# Patient Record
Sex: MALE | Race: WHITE | Employment: FULL TIME | URBAN - METROPOLITAN AREA
[De-identification: names, ages, dates, MRNs, and addresses within clinical notes are randomized per-mention and may not be internally consistent; named-entity substitution may affect disease eponyms.]

---

## 2017-09-08 PROBLEM — M12.811 ROTATOR CUFF TEAR ARTHROPATHY OF RIGHT SHOULDER: Status: ACTIVE | Noted: 2017-09-08

## 2017-09-08 PROBLEM — M75.101 ROTATOR CUFF TEAR ARTHROPATHY OF RIGHT SHOULDER: Status: ACTIVE | Noted: 2017-09-08

## 2018-09-22 ENCOUNTER — APPOINTMENT (OUTPATIENT)
Dept: CT IMAGING | Facility: HOSPITAL | Age: 58
DRG: 164 | End: 2018-09-22
Attending: EMERGENCY MEDICINE
Payer: COMMERCIAL

## 2018-09-22 ENCOUNTER — APPOINTMENT (OUTPATIENT)
Dept: GENERAL RADIOLOGY | Facility: HOSPITAL | Age: 58
DRG: 164 | End: 2018-09-22
Attending: EMERGENCY MEDICINE
Payer: COMMERCIAL

## 2018-09-22 ENCOUNTER — HOSPITAL ENCOUNTER (INPATIENT)
Facility: HOSPITAL | Age: 58
LOS: 6 days | Discharge: HOME | DRG: 164 | End: 2018-09-28
Attending: EMERGENCY MEDICINE | Admitting: HOSPITALIST
Payer: COMMERCIAL

## 2018-09-22 DIAGNOSIS — J94.2 HEMOTHORAX ON LEFT: Primary | ICD-10-CM

## 2018-09-22 DIAGNOSIS — S22.42XD CLOSED FRACTURE OF MULTIPLE RIBS OF LEFT SIDE WITH ROUTINE HEALING, SUBSEQUENT ENCOUNTER: ICD-10-CM

## 2018-09-22 PROCEDURE — 99223 1ST HOSP IP/OBS HIGH 75: CPT | Performed by: HOSPITALIST

## 2018-09-22 PROCEDURE — 71260 CT THORAX DX C+: CPT | Performed by: EMERGENCY MEDICINE

## 2018-09-22 PROCEDURE — 71045 X-RAY EXAM CHEST 1 VIEW: CPT | Performed by: EMERGENCY MEDICINE

## 2018-09-22 RX ORDER — MORPHINE SULFATE 4 MG/ML
8 INJECTION, SOLUTION INTRAMUSCULAR; INTRAVENOUS ONCE
Status: COMPLETED | OUTPATIENT
Start: 2018-09-22 | End: 2018-09-22

## 2018-09-22 RX ORDER — MORPHINE SULFATE 4 MG/ML
10 INJECTION, SOLUTION INTRAMUSCULAR; INTRAVENOUS ONCE
Status: COMPLETED | OUTPATIENT
Start: 2018-09-22 | End: 2018-09-22

## 2018-09-23 ENCOUNTER — APPOINTMENT (OUTPATIENT)
Dept: ULTRASOUND IMAGING | Facility: HOSPITAL | Age: 58
DRG: 164 | End: 2018-09-23
Attending: INTERNAL MEDICINE
Payer: COMMERCIAL

## 2018-09-23 ENCOUNTER — APPOINTMENT (OUTPATIENT)
Dept: GENERAL RADIOLOGY | Facility: HOSPITAL | Age: 58
DRG: 164 | End: 2018-09-23
Attending: SURGERY
Payer: COMMERCIAL

## 2018-09-23 PROCEDURE — 99233 SBSQ HOSP IP/OBS HIGH 50: CPT | Performed by: HOSPITALIST

## 2018-09-23 PROCEDURE — 0W9B3ZZ DRAINAGE OF LEFT PLEURAL CAVITY, PERCUTANEOUS APPROACH: ICD-10-PCS | Performed by: INTERNAL MEDICINE

## 2018-09-23 PROCEDURE — 32555 ASPIRATE PLEURA W/ IMAGING: CPT | Performed by: INTERNAL MEDICINE

## 2018-09-23 PROCEDURE — 71045 X-RAY EXAM CHEST 1 VIEW: CPT | Performed by: SURGERY

## 2018-09-23 PROCEDURE — 5A09357 ASSISTANCE WITH RESPIRATORY VENTILATION, LESS THAN 24 CONSECUTIVE HOURS, CONTINUOUS POSITIVE AIRWAY PRESSURE: ICD-10-PCS | Performed by: HOSPITALIST

## 2018-09-23 PROCEDURE — 99223 1ST HOSP IP/OBS HIGH 75: CPT | Performed by: INTERNAL MEDICINE

## 2018-09-23 RX ORDER — 0.9 % SODIUM CHLORIDE 0.9 %
VIAL (ML) INJECTION
Status: DISPENSED
Start: 2018-09-23 | End: 2018-09-23

## 2018-09-23 RX ORDER — HYDROMORPHONE HYDROCHLORIDE 1 MG/ML
1 INJECTION, SOLUTION INTRAMUSCULAR; INTRAVENOUS; SUBCUTANEOUS EVERY 2 HOUR PRN
Status: DISCONTINUED | OUTPATIENT
Start: 2018-09-23 | End: 2018-09-24

## 2018-09-23 RX ORDER — SODIUM CHLORIDE 9 MG/ML
INJECTION, SOLUTION INTRAVENOUS CONTINUOUS
Status: ACTIVE | OUTPATIENT
Start: 2018-09-23 | End: 2018-09-23

## 2018-09-23 RX ORDER — LISINOPRIL 10 MG/1
10 TABLET ORAL DAILY
Status: DISCONTINUED | OUTPATIENT
Start: 2018-09-23 | End: 2018-09-28

## 2018-09-23 RX ORDER — HYDRALAZINE HYDROCHLORIDE 20 MG/ML
10 INJECTION INTRAMUSCULAR; INTRAVENOUS EVERY 4 HOURS PRN
Status: DISCONTINUED | OUTPATIENT
Start: 2018-09-23 | End: 2018-09-27

## 2018-09-23 RX ORDER — PANTOPRAZOLE SODIUM 40 MG/1
40 TABLET, DELAYED RELEASE ORAL
Status: DISCONTINUED | OUTPATIENT
Start: 2018-09-23 | End: 2018-09-24

## 2018-09-23 RX ORDER — ATORVASTATIN CALCIUM 20 MG/1
20 TABLET, FILM COATED ORAL NIGHTLY
Status: DISCONTINUED | OUTPATIENT
Start: 2018-09-23 | End: 2018-09-28

## 2018-09-23 RX ORDER — HYDROCODONE BITARTRATE AND ACETAMINOPHEN 5; 325 MG/1; MG/1
1 TABLET ORAL EVERY 4 HOURS PRN
Status: DISCONTINUED | OUTPATIENT
Start: 2018-09-23 | End: 2018-09-24

## 2018-09-23 RX ORDER — FAMOTIDINE 10 MG/ML
20 INJECTION, SOLUTION INTRAVENOUS EVERY 12 HOURS
Status: DISCONTINUED | OUTPATIENT
Start: 2018-09-23 | End: 2018-09-24

## 2018-09-23 RX ORDER — ONDANSETRON 2 MG/ML
4 INJECTION INTRAMUSCULAR; INTRAVENOUS EVERY 6 HOURS PRN
Status: DISCONTINUED | OUTPATIENT
Start: 2018-09-23 | End: 2018-09-27

## 2018-09-23 RX ORDER — SODIUM CHLORIDE 0.9 % (FLUSH) 0.9 %
10 SYRINGE (ML) INJECTION AS NEEDED
Status: DISCONTINUED | OUTPATIENT
Start: 2018-09-23 | End: 2018-09-28

## 2018-09-23 RX ORDER — MORPHINE SULFATE 2 MG/ML
3 INJECTION, SOLUTION INTRAMUSCULAR; INTRAVENOUS EVERY 2 HOUR PRN
Status: DISCONTINUED | OUTPATIENT
Start: 2018-09-23 | End: 2018-09-26

## 2018-09-23 RX ORDER — TRAMADOL HYDROCHLORIDE 50 MG/1
100 TABLET ORAL EVERY 6 HOURS PRN
Status: DISCONTINUED | OUTPATIENT
Start: 2018-09-23 | End: 2018-09-24

## 2018-09-23 RX ORDER — LIDOCAINE 50 MG/G
1 PATCH TOPICAL EVERY 24 HOURS
Status: DISCONTINUED | OUTPATIENT
Start: 2018-09-23 | End: 2018-09-28

## 2018-09-23 RX ORDER — BUPROPION HYDROCHLORIDE 150 MG/1
150 TABLET ORAL DAILY
Status: DISCONTINUED | OUTPATIENT
Start: 2018-09-23 | End: 2018-09-28

## 2018-09-23 RX ORDER — OXYCODONE AND ACETAMINOPHEN 10; 325 MG/1; MG/1
1 TABLET ORAL EVERY 4 HOURS PRN
Status: DISCONTINUED | OUTPATIENT
Start: 2018-09-23 | End: 2018-09-23

## 2018-09-23 RX ORDER — ONDANSETRON 2 MG/ML
4 INJECTION INTRAMUSCULAR; INTRAVENOUS EVERY 4 HOURS PRN
Status: DISCONTINUED | OUTPATIENT
Start: 2018-09-23 | End: 2018-09-23

## 2018-09-23 RX ORDER — OXYCODONE AND ACETAMINOPHEN 10; 325 MG/1; MG/1
1 TABLET ORAL EVERY 4 HOURS PRN
Status: DISCONTINUED | OUTPATIENT
Start: 2018-09-23 | End: 2018-09-24

## 2018-09-23 RX ORDER — ACETAMINOPHEN 500 MG
1000 TABLET ORAL EVERY 8 HOURS
Status: DISCONTINUED | OUTPATIENT
Start: 2018-09-23 | End: 2018-09-27

## 2018-09-23 RX ORDER — LEVOTHYROXINE SODIUM 0.07 MG/1
75 TABLET ORAL
Status: DISCONTINUED | OUTPATIENT
Start: 2018-09-23 | End: 2018-09-28

## 2018-09-23 RX ORDER — ALLOPURINOL 100 MG/1
100 TABLET ORAL DAILY
Status: DISCONTINUED | OUTPATIENT
Start: 2018-09-23 | End: 2018-09-28

## 2018-09-23 RX ORDER — ONDANSETRON 2 MG/ML
8 INJECTION INTRAMUSCULAR; INTRAVENOUS EVERY 8 HOURS PRN
Status: DISCONTINUED | OUTPATIENT
Start: 2018-09-23 | End: 2018-09-24

## 2018-09-23 RX ORDER — MORPHINE SULFATE 4 MG/ML
4 INJECTION, SOLUTION INTRAMUSCULAR; INTRAVENOUS EVERY 2 HOUR PRN
Status: DISCONTINUED | OUTPATIENT
Start: 2018-09-23 | End: 2018-09-24

## 2018-09-23 RX ORDER — DULOXETIN HYDROCHLORIDE 30 MG/1
90 CAPSULE, DELAYED RELEASE ORAL DAILY
Status: DISCONTINUED | OUTPATIENT
Start: 2018-09-23 | End: 2018-09-28

## 2018-09-23 RX ORDER — OXYCODONE AND ACETAMINOPHEN TABLETS 10; 300 MG/1; MG/1
1 TABLET ORAL EVERY 4 HOURS PRN
COMMUNITY

## 2018-09-23 RX ORDER — HYDROMORPHONE HYDROCHLORIDE 1 MG/ML
0.5 INJECTION, SOLUTION INTRAMUSCULAR; INTRAVENOUS; SUBCUTANEOUS EVERY 2 HOUR PRN
Status: DISCONTINUED | OUTPATIENT
Start: 2018-09-23 | End: 2018-09-24

## 2018-09-23 RX ORDER — SODIUM CHLORIDE 9 MG/ML
INJECTION, SOLUTION INTRAVENOUS ONCE
Status: COMPLETED | OUTPATIENT
Start: 2018-09-23 | End: 2018-09-23

## 2018-09-23 RX ORDER — SODIUM CHLORIDE 9 MG/ML
INJECTION, SOLUTION INTRAVENOUS
Status: COMPLETED
Start: 2018-09-23 | End: 2018-09-23
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
## 2018-09-23 NOTE — PLAN OF CARE
Problem: PAIN - ADULT  Goal: Verbalizes/displays adequate comfort level or patient's stated pain goal  INTERVENTIONS:  - Encourage pt to monitor pain and request assistance  - Assess pain using appropriate pain scale  - Administer analgesics based on type lead EKG if indicated  - Evaluate effectiveness of antiarrhythmic and heart rate control medications as ordered  - Initiate emergency measures for life threatening arrhythmias  - Monitor electrolytes and administer replacement therapy as ordered  Outcome:

## 2018-09-23 NOTE — PROCEDURES
[facility]  Procedure Note  2018     Vidhi Hernandez Patient Status:  Inpatient    1960 MRN [id number]   Location [facility] 2W/SW Attending Lissette Cavanaugh MD   Hosp Day # 1 PCP Maliha Avendaño MD     Procedure: Sybil Prey

## 2018-09-23 NOTE — CONSULTS
[facility]    Report of Consultation    Chandra Bianchi Patient Status:  Inpatient    1960 MRN [id number]   Location [facility] 2W/SW Attending Christelle Campuzano MD   Hosp Day # 1 PCP Geraldine Garcia MD     Date of Admission:  9 HCl (ZOFRAN) injection 8 mg, 8 mg, Intravenous, Q8H PRN  •  famoTIDine (PEPCID) injection 20 mg, 20 mg, Intravenous, Q12H  •  allopurinol (ZYLOPRIM) tab 100 mg, 100 mg, Oral, Daily  •  BuPROPion HCl ER (XL) (WELLBUTRIN XL) 150 MG 24 hr tab 150 mg, 150 mg, sounds normal; no masses,  no organomegaly  Extremities: extremities normal, atraumatic, no cyanosis or edema  Skin: Skin color, texture, turgor normal. No rashes or lesions  Psychiatric: calm  concrete thoughts   memory intact    Results:  Lab Results   C Steve Barahona MD on 9/23/2018 at 6:38          Ekg 12-lead    Result Date: 9/22/2018  ECG Report  Interpretation  --------------------------         Impression and Plan:  Patient Active Problem List:     Osteoarthrosis, unspecified whether genera laboratory data in detail. Also reviewed outside records.     Time spent in direct patient contact and decision making as well as counseling/coordination of care:    [facility] min    Shala 30     9/23
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
## 2018-09-23 NOTE — PROGRESS NOTES
[facility]    Progress Note    Marily Ng Patient Status:  Inpatient    1960 MRN [id number]   Location [facility] 2W/SW Attending Hammad Chase MD   Hosp Day # 1 PCP Ovidio Davis MD       Subjective:   Marily Ng is MD CORTEZ on 9/23/2018 at 8:09          Xr Chest Ap Portable  (cpt=71045)    Result Date: 9/22/2018  CONCLUSION:  1. Multiple left rib fractures with left pleural effusion tracking laterally and posteriorly.  Atelectasis in left lung base, and left perihilar re spent.     Jazmin Estes MD  09/23/18

## 2018-09-23 NOTE — PROGRESS NOTES
Addendum to pulmonary consultation:    I attempted thoracentesis. With ultrasound review, the material in the left pleural space was dense and highly loculated. Only 3 cc of mike blood was forthcoming with the larger bore thoracentesis catheter.   PRASANNA gunter

## 2018-09-23 NOTE — H&P
[address] [place] Patient Status:  Emergency    1960 MRN [id number]   Location [facility] Attending Graham Pickens MD   Hosp Day # 0 PCP Erich Turner MD     Date:   150 MG Oral Tablet 24 Hr   Yes No   Sig: daily. DULoxetine HCl (CYMBALTA) 30 MG Oral Cap DR Particles   Yes No   Sig: Take 90 mg by mouth daily.      Levothyroxine Sodium (SYNTHROID, LEVOTHROID) 88 MCG Oral Tab   Yes No   Sig: before breakfast.     Johnanna Shelter Normal rate, regular rhythm, no murmur, no edema. Gastrointestinal:  Soft, non-tender, non-distended, normal bowel sounds, no organomegaly. Lymphatics:  No lymphadenopathy neck, axilla, groin. Musculoskeletal: Normal range of motion. normal strength.
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
## 2018-09-24 ENCOUNTER — ANESTHESIA EVENT (OUTPATIENT)
Dept: SURGERY | Facility: HOSPITAL | Age: 58
DRG: 164 | End: 2018-09-24
Payer: COMMERCIAL

## 2018-09-24 ENCOUNTER — APPOINTMENT (OUTPATIENT)
Dept: CT IMAGING | Facility: HOSPITAL | Age: 58
DRG: 164 | End: 2018-09-24
Attending: SURGERY
Payer: COMMERCIAL

## 2018-09-24 ENCOUNTER — APPOINTMENT (OUTPATIENT)
Dept: GENERAL RADIOLOGY | Facility: HOSPITAL | Age: 58
DRG: 164 | End: 2018-09-24
Attending: PHYSICIAN ASSISTANT
Payer: COMMERCIAL

## 2018-09-24 ENCOUNTER — ANESTHESIA (OUTPATIENT)
Dept: SURGERY | Facility: HOSPITAL | Age: 58
DRG: 164 | End: 2018-09-24
Payer: COMMERCIAL

## 2018-09-24 DIAGNOSIS — J94.2 HEMOTHORAX ON LEFT: Primary | ICD-10-CM

## 2018-09-24 PROCEDURE — 0BCL4ZZ EXTIRPATION OF MATTER FROM LEFT LUNG, PERCUTANEOUS ENDOSCOPIC APPROACH: ICD-10-PCS | Performed by: THORACIC SURGERY (CARDIOTHORACIC VASCULAR SURGERY)

## 2018-09-24 PROCEDURE — 00HU33Z INSERTION OF INFUSION DEVICE INTO SPINAL CANAL, PERCUTANEOUS APPROACH: ICD-10-PCS | Performed by: ANESTHESIOLOGY

## 2018-09-24 PROCEDURE — 99233 SBSQ HOSP IP/OBS HIGH 50: CPT | Performed by: HOSPITALIST

## 2018-09-24 PROCEDURE — 0BCP4ZZ EXTIRPATION OF MATTER FROM LEFT PLEURA, PERCUTANEOUS ENDOSCOPIC APPROACH: ICD-10-PCS | Performed by: THORACIC SURGERY (CARDIOTHORACIC VASCULAR SURGERY)

## 2018-09-24 PROCEDURE — 30233N1 TRANSFUSION OF NONAUTOLOGOUS RED BLOOD CELLS INTO PERIPHERAL VEIN, PERCUTANEOUS APPROACH: ICD-10-PCS | Performed by: THORACIC SURGERY (CARDIOTHORACIC VASCULAR SURGERY)

## 2018-09-24 PROCEDURE — 3E0T3BZ INTRODUCTION OF ANESTHETIC AGENT INTO PERIPHERAL NERVES AND PLEXI, PERCUTANEOUS APPROACH: ICD-10-PCS | Performed by: ANESTHESIOLOGY

## 2018-09-24 PROCEDURE — 74177 CT ABD & PELVIS W/CONTRAST: CPT | Performed by: SURGERY

## 2018-09-24 PROCEDURE — 3E0R33Z INTRODUCTION OF ANTI-INFLAMMATORY INTO SPINAL CANAL, PERCUTANEOUS APPROACH: ICD-10-PCS | Performed by: ANESTHESIOLOGY

## 2018-09-24 PROCEDURE — 99233 SBSQ HOSP IP/OBS HIGH 50: CPT | Performed by: INTERNAL MEDICINE

## 2018-09-24 PROCEDURE — 62320 NJX INTERLAMINAR CRV/THRC: CPT | Performed by: ANESTHESIOLOGY

## 2018-09-24 PROCEDURE — 71045 X-RAY EXAM CHEST 1 VIEW: CPT | Performed by: PHYSICIAN ASSISTANT

## 2018-09-24 RX ORDER — BUPIVACAINE HYDROCHLORIDE AND EPINEPHRINE 2.5; 5 MG/ML; UG/ML
INJECTION, SOLUTION INFILTRATION; PERINEURAL AS NEEDED
Status: DISCONTINUED | OUTPATIENT
Start: 2018-09-24 | End: 2018-09-24 | Stop reason: HOSPADM

## 2018-09-24 RX ORDER — DIPHENHYDRAMINE HYDROCHLORIDE 50 MG/ML
12.5 INJECTION INTRAMUSCULAR; INTRAVENOUS EVERY 4 HOURS PRN
Status: DISCONTINUED | OUTPATIENT
Start: 2018-09-24 | End: 2018-09-28

## 2018-09-24 RX ORDER — FAMOTIDINE 10 MG/ML
20 INJECTION, SOLUTION INTRAVENOUS 2 TIMES DAILY
Status: DISCONTINUED | OUTPATIENT
Start: 2018-09-24 | End: 2018-09-27

## 2018-09-24 RX ORDER — NALOXONE HYDROCHLORIDE 0.4 MG/ML
0.08 INJECTION, SOLUTION INTRAMUSCULAR; INTRAVENOUS; SUBCUTANEOUS
Status: DISCONTINUED | OUTPATIENT
Start: 2018-09-24 | End: 2018-09-28

## 2018-09-24 RX ORDER — MORPHINE SULFATE 4 MG/ML
4 INJECTION, SOLUTION INTRAMUSCULAR; INTRAVENOUS EVERY 4 HOURS PRN
Status: DISCONTINUED | OUTPATIENT
Start: 2018-09-24 | End: 2018-09-24

## 2018-09-24 RX ORDER — LIDOCAINE HYDROCHLORIDE 10 MG/ML
INJECTION, SOLUTION INFILTRATION; PERINEURAL
Status: COMPLETED | OUTPATIENT
Start: 2018-09-24 | End: 2018-09-24

## 2018-09-24 RX ORDER — CEFAZOLIN SODIUM/WATER 2 G/20 ML
2 SYRINGE (ML) INTRAVENOUS ONCE
Status: COMPLETED | OUTPATIENT
Start: 2018-09-24 | End: 2018-09-24

## 2018-09-24 RX ORDER — SODIUM CHLORIDE 9 MG/ML
INJECTION, SOLUTION INTRAVENOUS
Status: COMPLETED
Start: 2018-09-24 | End: 2018-09-24

## 2018-09-24 RX ORDER — GLYCOPYRROLATE 0.2 MG/ML
INJECTION INTRAMUSCULAR; INTRAVENOUS AS NEEDED
Status: DISCONTINUED | OUTPATIENT
Start: 2018-09-24 | End: 2018-09-24 | Stop reason: SURG

## 2018-09-24 RX ORDER — DOCUSATE SODIUM 100 MG/1
100 CAPSULE, LIQUID FILLED ORAL 2 TIMES DAILY
Status: DISCONTINUED | OUTPATIENT
Start: 2018-09-24 | End: 2018-09-28

## 2018-09-24 RX ORDER — ONDANSETRON 2 MG/ML
4 INJECTION INTRAMUSCULAR; INTRAVENOUS ONCE AS NEEDED
Status: DISCONTINUED | OUTPATIENT
Start: 2018-09-24 | End: 2018-09-24

## 2018-09-24 RX ORDER — ROCURONIUM BROMIDE 10 MG/ML
INJECTION, SOLUTION INTRAVENOUS AS NEEDED
Status: DISCONTINUED | OUTPATIENT
Start: 2018-09-24 | End: 2018-09-24 | Stop reason: SURG

## 2018-09-24 RX ORDER — MORPHINE SULFATE 4 MG/ML
6 INJECTION, SOLUTION INTRAMUSCULAR; INTRAVENOUS EVERY 4 HOURS PRN
Status: DISCONTINUED | OUTPATIENT
Start: 2018-09-24 | End: 2018-09-24

## 2018-09-24 RX ORDER — BUPIVACAINE HYDROCHLORIDE 2.5 MG/ML
INJECTION, SOLUTION EPIDURAL; INFILTRATION; INTRACAUDAL AS NEEDED
Status: DISCONTINUED | OUTPATIENT
Start: 2018-09-24 | End: 2018-09-24 | Stop reason: SURG

## 2018-09-24 RX ORDER — NEOSTIGMINE METHYLSULFATE 0.5 MG/ML
INJECTION INTRAVENOUS AS NEEDED
Status: DISCONTINUED | OUTPATIENT
Start: 2018-09-24 | End: 2018-09-24 | Stop reason: SURG

## 2018-09-24 RX ORDER — DEXTROSE, SODIUM CHLORIDE, AND POTASSIUM CHLORIDE 5; .9; .15 G/100ML; G/100ML; G/100ML
INJECTION INTRAVENOUS CONTINUOUS
Status: DISCONTINUED | OUTPATIENT
Start: 2018-09-24 | End: 2018-09-27

## 2018-09-24 RX ORDER — DEXAMETHASONE SODIUM PHOSPHATE 4 MG/ML
VIAL (ML) INJECTION AS NEEDED
Status: DISCONTINUED | OUTPATIENT
Start: 2018-09-24 | End: 2018-09-24 | Stop reason: SURG

## 2018-09-24 RX ORDER — LABETALOL HYDROCHLORIDE 5 MG/ML
INJECTION, SOLUTION INTRAVENOUS AS NEEDED
Status: DISCONTINUED | OUTPATIENT
Start: 2018-09-24 | End: 2018-09-24 | Stop reason: SURG

## 2018-09-24 RX ORDER — HYDROCODONE BITARTRATE AND ACETAMINOPHEN 10; 325 MG/1; MG/1
1 TABLET ORAL EVERY 4 HOURS PRN
Status: DISCONTINUED | OUTPATIENT
Start: 2018-09-24 | End: 2018-09-24

## 2018-09-24 RX ORDER — MIDAZOLAM HYDROCHLORIDE 1 MG/ML
INJECTION INTRAMUSCULAR; INTRAVENOUS AS NEEDED
Status: DISCONTINUED | OUTPATIENT
Start: 2018-09-24 | End: 2018-09-24 | Stop reason: SURG

## 2018-09-24 RX ORDER — LIDOCAINE HYDROCHLORIDE 40 MG/ML
SOLUTION TOPICAL AS NEEDED
Status: DISCONTINUED | OUTPATIENT
Start: 2018-09-24 | End: 2018-09-24 | Stop reason: SURG

## 2018-09-24 RX ORDER — ONDANSETRON 2 MG/ML
4 INJECTION INTRAMUSCULAR; INTRAVENOUS EVERY 6 HOURS PRN
Status: DISCONTINUED | OUTPATIENT
Start: 2018-09-24 | End: 2018-09-28

## 2018-09-24 RX ORDER — HYDROCODONE BITARTRATE AND ACETAMINOPHEN 10; 325 MG/1; MG/1
2 TABLET ORAL EVERY 4 HOURS PRN
Status: DISCONTINUED | OUTPATIENT
Start: 2018-09-24 | End: 2018-09-24

## 2018-09-24 RX ORDER — NALBUPHINE HCL 10 MG/ML
2.5 AMPUL (ML) INJECTION EVERY 4 HOURS PRN
Status: DISCONTINUED | OUTPATIENT
Start: 2018-09-24 | End: 2018-09-28

## 2018-09-24 RX ORDER — FAMOTIDINE 20 MG/1
20 TABLET ORAL 2 TIMES DAILY
Status: DISCONTINUED | OUTPATIENT
Start: 2018-09-24 | End: 2018-09-28

## 2018-09-24 RX ORDER — BISACODYL 10 MG
10 SUPPOSITORY, RECTAL RECTAL
Status: DISCONTINUED | OUTPATIENT
Start: 2018-09-24 | End: 2018-09-28

## 2018-09-24 RX ORDER — SODIUM CHLORIDE 9 MG/ML
INJECTION, SOLUTION INTRAVENOUS CONTINUOUS PRN
Status: DISCONTINUED | OUTPATIENT
Start: 2018-09-24 | End: 2018-09-24 | Stop reason: SURG

## 2018-09-24 RX ORDER — ONDANSETRON 2 MG/ML
INJECTION INTRAMUSCULAR; INTRAVENOUS AS NEEDED
Status: DISCONTINUED | OUTPATIENT
Start: 2018-09-24 | End: 2018-09-24 | Stop reason: SURG

## 2018-09-24 RX ORDER — SODIUM PHOSPHATE, DIBASIC AND SODIUM PHOSPHATE, MONOBASIC 7; 19 G/133ML; G/133ML
1 ENEMA RECTAL ONCE AS NEEDED
Status: DISCONTINUED | OUTPATIENT
Start: 2018-09-24 | End: 2018-09-28

## 2018-09-24 RX ORDER — LIDOCAINE HYDROCHLORIDE AND EPINEPHRINE 20; 5 MG/ML; UG/ML
INJECTION, SOLUTION EPIDURAL; INFILTRATION; INTRACAUDAL; PERINEURAL AS NEEDED
Status: DISCONTINUED | OUTPATIENT
Start: 2018-09-24 | End: 2018-09-24 | Stop reason: SURG

## 2018-09-24 RX ORDER — POLYETHYLENE GLYCOL 3350 17 G/17G
17 POWDER, FOR SOLUTION ORAL DAILY PRN
Status: DISCONTINUED | OUTPATIENT
Start: 2018-09-24 | End: 2018-09-27

## 2018-09-24 RX ORDER — MORPHINE SULFATE 4 MG/ML
2 INJECTION, SOLUTION INTRAMUSCULAR; INTRAVENOUS EVERY 4 HOURS PRN
Status: DISCONTINUED | OUTPATIENT
Start: 2018-09-24 | End: 2018-09-24

## 2018-09-24 RX ORDER — HEPARIN SODIUM 5000 [USP'U]/ML
5000 INJECTION, SOLUTION INTRAVENOUS; SUBCUTANEOUS EVERY 12 HOURS SCHEDULED
Status: DISCONTINUED | OUTPATIENT
Start: 2018-09-25 | End: 2018-09-28

## 2018-09-24 RX ORDER — HYDROMORPHONE HYDROCHLORIDE 1 MG/ML
INJECTION, SOLUTION INTRAMUSCULAR; INTRAVENOUS; SUBCUTANEOUS
Status: DISPENSED
Start: 2018-09-24 | End: 2018-09-25

## 2018-09-24 RX ORDER — ACETAMINOPHEN 325 MG/1
650 TABLET ORAL EVERY 4 HOURS PRN
Status: DISCONTINUED | OUTPATIENT
Start: 2018-09-24 | End: 2018-09-28

## 2018-09-24 RX ORDER — LIDOCAINE HYDROCHLORIDE AND EPINEPHRINE 15; 5 MG/ML; UG/ML
INJECTION, SOLUTION EPIDURAL
Status: COMPLETED | OUTPATIENT
Start: 2018-09-24 | End: 2018-09-24

## 2018-09-24 RX ADMIN — DEXAMETHASONE SODIUM PHOSPHATE 4 MG: 4 MG/ML VIAL (ML) INJECTION at 16:35:00

## 2018-09-24 RX ADMIN — LIDOCAINE HYDROCHLORIDE AND EPINEPHRINE 3 ML: 15; 5 INJECTION, SOLUTION EPIDURAL at 15:40:00

## 2018-09-24 RX ADMIN — LIDOCAINE HYDROCHLORIDE 4 ML: 40 SOLUTION TOPICAL at 15:30:00

## 2018-09-24 RX ADMIN — BUPIVACAINE HYDROCHLORIDE 5 ML: 2.5 INJECTION, SOLUTION EPIDURAL; INFILTRATION; INTRACAUDAL at 16:30:00

## 2018-09-24 RX ADMIN — SODIUM CHLORIDE: 9 INJECTION, SOLUTION INTRAVENOUS at 18:01:00

## 2018-09-24 RX ADMIN — BUPIVACAINE HYDROCHLORIDE 5 ML: 2.5 INJECTION, SOLUTION EPIDURAL; INFILTRATION; INTRACAUDAL at 16:14:00

## 2018-09-24 RX ADMIN — LIDOCAINE HYDROCHLORIDE AND EPINEPHRINE 5 ML: 20; 5 INJECTION, SOLUTION EPIDURAL; INFILTRATION; INTRACAUDAL; PERINEURAL at 16:14:00

## 2018-09-24 RX ADMIN — LIDOCAINE HYDROCHLORIDE 1 ML: 10 INJECTION, SOLUTION INFILTRATION; PERINEURAL at 15:40:00

## 2018-09-24 RX ADMIN — BUPIVACAINE HYDROCHLORIDE 5 ML: 2.5 INJECTION, SOLUTION EPIDURAL; INFILTRATION; INTRACAUDAL at 17:05:00

## 2018-09-24 RX ADMIN — NEOSTIGMINE METHYLSULFATE 4 MG: 0.5 INJECTION INTRAVENOUS at 17:20:00

## 2018-09-24 RX ADMIN — LABETALOL HYDROCHLORIDE 10 MG: 5 INJECTION, SOLUTION INTRAVENOUS at 17:26:00

## 2018-09-24 RX ADMIN — SODIUM CHLORIDE: 9 INJECTION, SOLUTION INTRAVENOUS at 15:23:00

## 2018-09-24 RX ADMIN — LABETALOL HYDROCHLORIDE 10 MG: 5 INJECTION, SOLUTION INTRAVENOUS at 17:20:00

## 2018-09-24 RX ADMIN — GLYCOPYRROLATE 0.8 MG: 0.2 INJECTION INTRAMUSCULAR; INTRAVENOUS at 17:20:00

## 2018-09-24 RX ADMIN — ROCURONIUM BROMIDE 50 MG: 10 INJECTION, SOLUTION INTRAVENOUS at 15:50:00

## 2018-09-24 RX ADMIN — ROCURONIUM BROMIDE 30 MG: 10 INJECTION, SOLUTION INTRAVENOUS at 16:40:00

## 2018-09-24 RX ADMIN — LIDOCAINE HYDROCHLORIDE AND EPINEPHRINE 3 ML: 20; 5 INJECTION, SOLUTION EPIDURAL; INFILTRATION; INTRACAUDAL; PERINEURAL at 15:28:00

## 2018-09-24 RX ADMIN — MIDAZOLAM HYDROCHLORIDE 2 MG: 1 INJECTION INTRAMUSCULAR; INTRAVENOUS at 15:24:00

## 2018-09-24 RX ADMIN — ONDANSETRON 4 MG: 2 INJECTION INTRAMUSCULAR; INTRAVENOUS at 16:35:00

## 2018-09-24 RX ADMIN — ROCURONIUM BROMIDE 20 MG: 10 INJECTION, SOLUTION INTRAVENOUS at 16:15:00

## 2018-09-24 NOTE — CONSULTS
[facility]    Report of Consultation    Rhoda Blank Patient Status:  Inpatient    1960 MRN [id number]   Location [facility] 2W/SW Attending Kassi Esquivel MD   Hosp Day # 2 PCP Erich Turner MD     Date of Admission:   Intravenous Q2H PRN   morphINE sulfate (PF) 4 MG/ML injection 4 mg 4 mg Intravenous Q2H PRN   TraMADol HCl (ULTRAM) tab 100 mg 100 mg Oral Q6H PRN   acetaminophen (TYLENOL EXTRA STRENGTH) tab 1,000 mg 1,000 mg Oral Q8H   ondansetron HCl (ZOFRAN) injection times daily as needed. Levothyroxine Sodium (SYNTHROID, LEVOTHROID) 88 MCG Oral Tab before breakfast.     lisinopril (PRINIVIL,ZESTRIL) 10 MG Oral Tab daily.      Omeprazole 40 MG Oral Capsule Delayed Release 40 mg.     Pravastatin Sodium (PRAVACHOL) 40 • lidocaine  1 patch Transdermal Q24H       Continuous Infusions:     Patient is awake alert and in moderate distress along the left side chest wall and left upper quadrant. .  The head is normocephalic. Pupils are equally round and reactive.   The sclera left rib fractures. 2. No additional acute traumatic abnormality within the abdomen/pelvis. 3. Dependent sludge and/or vicarious excretion of contrast within the gallbladder. 4. Probable bilateral renal cysts. 5. Colonic diverticulosis.  Periampullary duode Impression:     Hemothorax on left  Will need surgical evacuation and possible decortication      Closed fracture of multiple ribs of left side with routine healing, subsequent encounter          Recommendations:  Left VAT possible thoracotomy for zimmerman

## 2018-09-24 NOTE — ANESTHESIA PROCEDURE NOTES
Epidural Block  Date/Time: 9/24/2018 3:40 PM  Performed by: Reema Cowan MD  Authorized by: Reema Cowan MD     Patient Location:  OR  Start Time:  9/24/2018 3:40 PM  End Time:  9/24/2018 4:55 PM  Site Identification: surface landmarks    Reason for Bl
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
## 2018-09-24 NOTE — ANESTHESIA POSTPROCEDURE EVALUATION
Patient: Jemal Males    Procedure Summary     Date:  09/24/18 Room / Location:  [address] [place] [address] MAIN OR 03 / [address] [place] [address] MAIN OR    Anesthesia Start:  1518 Anesthesia Stop:  3216    Procedure:  THORACOSCOPY/VATS (Left ) Diagnosis:       Hemothorax on left      (Hemothorax o

## 2018-09-24 NOTE — ANESTHESIA PREPROCEDURE EVALUATION
Anesthesia PreOp Note    HPI:     Tree Basilio is a 59year old male who presents for preoperative consultation requested by: Maria De Jesus Cardona MD    Date of Surgery: 9/22/2018 - 9/24/2018    Procedure(s):  THORACOSCOPY/VATS  Indication: Hemothorax on l 06/20/2013      Spinal stenosis, lumbar region, without neurogenic claudication         Date Noted: 06/20/2013        Past Medical History:  No date: Arthritis  No date: High blood pressure  No date: High cholesterol  No date: Sleep apnea    Past Surgical (MARCAINE) 0.25 % injection   PRN Reema Cowan MD 5 mL at 09/24/18 1630   ondansetron HCl (ZOFRAN) injection 4 mg 4 mg Intravenous Once PRN Warden Deejay Walsh MD    Prochlorperazine Edisylate (COMPAZINE) injection 5 mg 5 mg Intravenous Once PRN Reema Cowan 09/24/18 0619   [MAR Hold] acetaminophen (TYLENOL EXTRA STRENGTH) tab 1,000 mg 1,000 mg Oral Q8H Dane Brock MD 1,000 mg at 09/24/18 0925   [MAR Hold] ondansetron HCl (ZOFRAN) injection 8 mg 8 mg Intravenous Q8H PRN MD [facility]   [MAR Hold] oxyCODONE-acetaminophen (PERCOCET)  MG per tab 1 tablet 1 tablet Oral Q4H PRN Aime Watkins MD    [MAR Hold] Normal Saline Flush 0.9 % injection 10 mL 10 mL Intravenous PRN Hansel Lopes MD      No current Epic-ordered outpatie No    Social History Narrative      Not on file      Available pre-op labs reviewed. Lab Results   Component Value Date    WBC 7.7 09/24/2018    RBC 2.80 (L) 09/24/2018    HGB 8.0 (L) 09/24/2018    HCT 24.4 (L) 09/24/2018    MCV 86.9 09/24/2018    MCH 28. epidural for post op pain control. GA/Art line possible need for blood transfusion discussed. Consent checked.       I have informed Buddy Mark and/or legal guardian or family member of the nature of the anesthetic plan, benefits, risks including possi

## 2018-09-24 NOTE — PROGRESS NOTES
[facility]    Progress Note    Lin Kaplan Patient Status:  Inpatient    1960 MRN [id number]   Location [facility] 2W/SW Attending Galo Chambers MD   Hosp Day # 2 PCP Nathan Robertson MD       Subjective:   Lin Zees is the abdomen/pelvis. 3. Dependent sludge and/or vicarious excretion of contrast within the gallbladder. 4. Probable bilateral renal cysts. 5. Colonic diverticulosis. Periampullary duodenal diverticulum.  6. 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

## 2018-09-24 NOTE — OPERATIVE REPORT
[facility] POST ANESTHESIA CARE UNIT  Operative Note     Harry Mayers Location: OR   [facility] [id number] MRN [id number]   Admission Date 9/22/2018 Operation Date 9/24/2018   Attending Physician Danni Almonte MD Operating Physician Zuleima Hernandez MD device removed all the rest of the clot and decorticated lung so it would expand and fill the space.   I then performed an intercostal nerve block multiple levels with quarter percent Marcaine  I then placed a 36 chest tube with the tip up at the apex as we

## 2018-09-24 NOTE — ANESTHESIA PROCEDURE NOTES
ANESTHESIA INTUBATION  Date/Time: 9/24/2018 3:50 PM  Urgency: elective    Airway not difficult    General Information and Staff    Anesthesiologist: Ibis Saldivar MD  Performed: anesthesiologist     Indications and Patient Condition  Indications for airwa

## 2018-09-24 NOTE — ANESTHESIA PROCEDURE NOTES
Arterial Line  Performed by: Yvan Rodriguez MD  Authorized by: Yvan Rodriguez MD     Procedure Start:  9/24/2018 3:28 PM  Procedure End:  9/24/2018 3:32 PM  Patient Location:  OR  Indication: continuous blood pressure monitoring    Anesthesiologist:  Galindo Arias

## 2018-09-24 NOTE — ANESTHESIA PROCEDURE NOTES
Peripheral IV  Date/Time: 9/24/2018 3:45 PM  Inserted by: Vincent Candelaria MD    Placement  Needle size: 18 G  Laterality: right  Location: antecubital  Site prep: alcohol  Attempts: 2

## 2018-09-24 NOTE — PROGRESS NOTES
[facility] - [facility]    Progress Note    Axel Lama Patient Status:  Inpatient    1960 MRN [id number]   Location [facility] 2W/SW Attending Constanza Nelson MD   Hosp Day # 2 PCP Sedrick Painting MD       Subjective:   Axel Lama is 86.9  87.2   --    --   86.9   MCH  27.9  28.4   --    --   28.4   MCHC  32.1  32.6   --    --   32.6   RDW  15.4*  15.4*   --    --   15.7*   WBC  8.6  9.7   --    --   7.7   PLT  368  288   --    --   241     Lab Results   Component Value Date    INR 1.3 Randall Holden MD on 9/23/2018 at 6:32     Approved by (CST): Randall Barahona MD on 9/23/2018 at 6:38          Ekg 12-lead    Result Date: 9/22/2018  ECG Report  Interpretation  -------------------------- Sinus Rhythm WITHIN NORMAL LIMITS When compared

## 2018-09-25 ENCOUNTER — APPOINTMENT (OUTPATIENT)
Dept: GENERAL RADIOLOGY | Facility: HOSPITAL | Age: 58
DRG: 164 | End: 2018-09-25
Attending: PHYSICIAN ASSISTANT
Payer: COMMERCIAL

## 2018-09-25 PROCEDURE — 71045 X-RAY EXAM CHEST 1 VIEW: CPT | Performed by: PHYSICIAN ASSISTANT

## 2018-09-25 PROCEDURE — 99233 SBSQ HOSP IP/OBS HIGH 50: CPT | Performed by: INTERNAL MEDICINE

## 2018-09-25 PROCEDURE — 99232 SBSQ HOSP IP/OBS MODERATE 35: CPT | Performed by: HOSPITALIST

## 2018-09-25 RX ORDER — HYDROMORPHONE HYDROCHLORIDE 1 MG/ML
0.2 INJECTION, SOLUTION INTRAMUSCULAR; INTRAVENOUS; SUBCUTANEOUS ONCE
Status: DISCONTINUED | OUTPATIENT
Start: 2018-09-24 | End: 2018-09-26

## 2018-09-25 RX ORDER — MELATONIN
325
Status: DISCONTINUED | OUTPATIENT
Start: 2018-09-25 | End: 2018-09-28

## 2018-09-25 NOTE — PLAN OF CARE
-Pleura vac changed by APN  - pt ambulating in room and up to chair  - Epidural removed at 0900, sub q Heparin to be held for 12 hours  - Dilaudid PCA controlling pain        CARDIOVASCULAR - ADULT    • Maintains optimal cardiac output and hemodynamic stab

## 2018-09-25 NOTE — PROGRESS NOTES
[facility]    Progress Note    Murtis Lax Patient Status:  Inpatient    1960 MRN [id number]   Location [facility] 2W/SW Attending Tammy Lafleur MD   Hosp Day # 3 PCP Prasanna Perez MD     Subjective:  Pt.  Awake, alert and o Extremities: Warm, dry, no LE edema bilat  Pulses: 2+ bilat DP  Skin: Left thoracic incision w/drsg= CDI  Neurological:  AAOx3, MAEW    Assessment/Plan:  S/P LEFT VATS/EVACUATION OF HEMATOMA/LUNG DECORTICATION POD #1  Encourage Pulm Toilet; IS-able to pe

## 2018-09-25 NOTE — PROGRESS NOTES
[facility]P - [facility]    Progress Note    Juliana Allan Patient Status:  Inpatient    1960 MRN [id number]   Location [facility] 2W/SW Attending Luis E Velarde MD   Hosp Day # 3 PCP Graciela Vega MD       Subjective:   Juliana Allan is left rib fractures. 2. No additional acute traumatic abnormality within the abdomen/pelvis. 3. Dependent sludge and/or vicarious excretion of contrast within the gallbladder. 4. Probable bilateral renal cysts. 5. Colonic diverticulosis.  Periampullary duode (CST): Vasyl Maloney MD on 9/24/2018 at 18:50          Xr Chest Ap Portable  (cpt=71045)    Result Date: 9/23/2018  CONCLUSION:  1. No significant change in the appearance of left pleural effusion.      Dictated by (CST): Yanely Ruby MD on 9/23/2 PCA  Chest tube in place  O2 protocol, was not on home o2.      Closed fracture of multiple ribs of left side with routine healing, subsequent encounter  Pain control with IV and PO pain meds   Surgical consult, Dr. Jarret Santana following   CT abd reviewed,

## 2018-09-25 NOTE — TRANSITION NOTE
2200--SHOUTING IN PAIN;  EPIDURAL STARTED AT 2054;  NO RELIEF AN HOUR LATER AFTER CONT RATE + 2 PCA BOLUSES. [facility] EPIDURAL DRESSING D/I. ANESTHESIA ON CALL DR MICHAEL NOTIFIED OF PT SHOUTING IN PAIN ONE HOUR AFTER EPIDURAL STARTED.   ONE MG DILAUDID GIV

## 2018-09-25 NOTE — ANESTHESIA POST-OP FOLLOW-UP NOTE
S/p thoracoscopy/VATS,thoracic epidural placement   POD # 1,doing well  During night minimal pain relief   VAS 7-8/10   Settings 5/3/30/11  Started IV Dilaudid pain is better controlled  Denies HA no BA no new neurologic signs or symptoms   Site is clean d
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
## 2018-09-26 ENCOUNTER — APPOINTMENT (OUTPATIENT)
Dept: GENERAL RADIOLOGY | Facility: HOSPITAL | Age: 58
DRG: 164 | End: 2018-09-26
Attending: PHYSICIAN ASSISTANT
Payer: COMMERCIAL

## 2018-09-26 PROCEDURE — 99232 SBSQ HOSP IP/OBS MODERATE 35: CPT | Performed by: HOSPITALIST

## 2018-09-26 PROCEDURE — 71045 X-RAY EXAM CHEST 1 VIEW: CPT | Performed by: PHYSICIAN ASSISTANT

## 2018-09-26 PROCEDURE — 99233 SBSQ HOSP IP/OBS HIGH 50: CPT | Performed by: INTERNAL MEDICINE

## 2018-09-26 RX ORDER — HYDROCODONE BITARTRATE AND ACETAMINOPHEN 5; 325 MG/1; MG/1
2 TABLET ORAL EVERY 4 HOURS PRN
Status: DISCONTINUED | OUTPATIENT
Start: 2018-09-26 | End: 2018-09-26

## 2018-09-26 RX ORDER — CALCIUM CARBONATE 200(500)MG
500 TABLET,CHEWABLE ORAL 2 TIMES DAILY PRN
Status: DISCONTINUED | OUTPATIENT
Start: 2018-09-26 | End: 2018-09-28

## 2018-09-26 RX ORDER — HYDROMORPHONE HYDROCHLORIDE 1 MG/ML
0.5 INJECTION, SOLUTION INTRAMUSCULAR; INTRAVENOUS; SUBCUTANEOUS EVERY 2 HOUR PRN
Status: DISCONTINUED | OUTPATIENT
Start: 2018-09-26 | End: 2018-09-27

## 2018-09-26 RX ORDER — HYDROMORPHONE HYDROCHLORIDE 1 MG/ML
0.4 INJECTION, SOLUTION INTRAMUSCULAR; INTRAVENOUS; SUBCUTANEOUS EVERY 2 HOUR PRN
Status: DISCONTINUED | OUTPATIENT
Start: 2018-09-26 | End: 2018-09-27

## 2018-09-26 RX ORDER — HYDROCODONE BITARTRATE AND ACETAMINOPHEN 5; 325 MG/1; MG/1
1 TABLET ORAL EVERY 4 HOURS PRN
Status: DISCONTINUED | OUTPATIENT
Start: 2018-09-26 | End: 2018-09-26

## 2018-09-26 RX ORDER — HYDROMORPHONE HYDROCHLORIDE 1 MG/ML
0.3 INJECTION, SOLUTION INTRAMUSCULAR; INTRAVENOUS; SUBCUTANEOUS EVERY 2 HOUR PRN
Status: DISCONTINUED | OUTPATIENT
Start: 2018-09-26 | End: 2018-09-27

## 2018-09-26 RX ORDER — HYDROCODONE BITARTRATE AND ACETAMINOPHEN 7.5; 325 MG/1; MG/1
2 TABLET ORAL EVERY 6 HOURS PRN
Status: DISCONTINUED | OUTPATIENT
Start: 2018-09-26 | End: 2018-09-27

## 2018-09-26 RX ORDER — HYDROCODONE BITARTRATE AND ACETAMINOPHEN 7.5; 325 MG/1; MG/1
1 TABLET ORAL EVERY 6 HOURS PRN
Status: DISCONTINUED | OUTPATIENT
Start: 2018-09-26 | End: 2018-09-27

## 2018-09-26 NOTE — PLAN OF CARE
Achieves optimal ventilation and oxygenation Progressing    Lt CT removed without difficulty. Shamika well. On room air with sats in upper 90's even upon ambulation.    Verbalizes/displays adequate comfort level or patient's stated pain goal Progressing    Pca

## 2018-09-26 NOTE — PROGRESS NOTES
[facility]    Progress Note    Andres Gibson Patient Status:  Inpatient    1960 MRN [id number]   Location [facility] 2W/SW Attending Nelson Ruvalcaba MD   [facility] Day # 4 PCP Elena Puckett MD       Subjective:   Andres Gibson is CREATSERUM  0.72  0.70  0.70   GFRAA  >60  >60  >60   GFRNAA  >60  >60  >60   CA  8.9  8.7  8.7   ALB   --   2.8*   --    NA  133*  138  138   K  5.5*  4.7  4.5   CL  103  106  106   CO2  26  26  24   ALKPHO   --   45   --    AST   --   35   --    ALT Date: 9/24/2018  CONCLUSION: Left chest tube has been placed. There is improved aeration of the left lung and there is decrease in size of a left pleural effusion. No pneumothorax. Atelectasis in the left lung base.    Dictated by (CST): Vasyl Maloney MD

## 2018-09-26 NOTE — PHYSICAL THERAPY NOTE
PHYSICAL THERAPY TREATMENT NOTE - INPATIENT     Room Number: 228/228-A            Problem List  Principal Problem:    Hemothorax on left  Active Problems:    Closed fracture of multiple ribs of left side with routine healing, subsequent encounter      EDIE Priyanka     AM-PAC Score:  Raw Score: 18   PT Approx Degree of Impairment Score: 46.58%   Standardized Score (AM-PAC Scale): 43.63   CMS Modifier (G-Code): CK    FUNCTIONAL ABILITY STATUS  Gait Assessment   Gait Assistance: Minimum assistance   150ft

## 2018-09-26 NOTE — PLAN OF CARE
CARDIOVASCULAR - ADULT    • Absence of cardiac arrhythmias or at baseline Progressing        Patient Centered Care    • Patient preferences are identified and integrated in the patient's plan of care Progressing        RESPIRATORY - ADULT    • Achieves opt
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
## 2018-09-26 NOTE — PROGRESS NOTES
[facility]    Progress Note    Axel Lama Patient Status:  Inpatient    1960 MRN [id number]   Location [facility] 2W/SW Attending Constanza Nelson MD   [facility] Day # 4 PCP Sedrick Painting MD       Subjective:   Axel Lama is left rib fractures. 2. No additional acute traumatic abnormality within the abdomen/pelvis. 3. Dependent sludge and/or vicarious excretion of contrast within the gallbladder. 4. Probable bilateral renal cysts. 5. Colonic diverticulosis.  Periampullary duode resolve, then followup CT scan is indicated.      Dictated by (CST): Flores Bragg MD on 9/25/2018 at 7:50     Approved by (CST): Flores Bragg MD on 9/25/2018 at 8:01          Xr Chest Ap Portable  (cpt=71045)    Result Date: 9/24/2018  CONCLUSION: Left examined. I agree with the above. The patient is feeling well. Chest tube is to be removed today. We will monitor and hopefully can go home soon.     Dev Rosa MD

## 2018-09-26 NOTE — PROGRESS NOTES
Assumed care of patient at this time. Plan of care and SBAR received from and reviewed with Bonnie Gutiérrez RN.
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
## 2018-09-26 NOTE — PHYSICAL THERAPY NOTE
PHYSICAL THERAPY EVALUATION - INPATIENT     Room Number: 228/228-A  Evaluation Date: 9/26/2018  Type of Evaluation: Initial           Reason for Therapy: Mobility Dysfunction and Discharge Planning    PHYSICAL THERAPY ASSESSMENT     Patient is a 59 year o date: TONSILLECTOMY    HOME SITUATION  Type of Home: House   Home Layout: Two level  Stairs to Enter : (3)  Railing: Yes  Stairs to Bedroom: (12)  Railing: Yes       Drives: Yes  Patient Owned Equipment: None       Prior Level of [place]: ind    SUBJE training    Patient End of Session: In bed    CURRENT GOALS    Goals to be met by: 10/3/18  Patient Goal Patient's self-stated goal is: to go home   Goal #1 Patient is able to demonstrate supine - sit EOB @ level: supervision     Goal #1   Current Status

## 2018-09-27 ENCOUNTER — APPOINTMENT (OUTPATIENT)
Dept: GENERAL RADIOLOGY | Facility: HOSPITAL | Age: 58
DRG: 164 | End: 2018-09-27
Attending: CLINICAL NURSE SPECIALIST
Payer: COMMERCIAL

## 2018-09-27 PROCEDURE — 99232 SBSQ HOSP IP/OBS MODERATE 35: CPT | Performed by: HOSPITALIST

## 2018-09-27 PROCEDURE — 71046 X-RAY EXAM CHEST 2 VIEWS: CPT | Performed by: CLINICAL NURSE SPECIALIST

## 2018-09-27 PROCEDURE — 99233 SBSQ HOSP IP/OBS HIGH 50: CPT | Performed by: INTERNAL MEDICINE

## 2018-09-27 RX ORDER — HYDROCODONE BITARTRATE AND ACETAMINOPHEN 10; 325 MG/1; MG/1
1-2 TABLET ORAL EVERY 4 HOURS PRN
Status: DISCONTINUED | OUTPATIENT
Start: 2018-09-27 | End: 2018-09-28

## 2018-09-27 RX ORDER — SENNOSIDES 8.6 MG
8.6 TABLET ORAL 2 TIMES DAILY
Status: DISCONTINUED | OUTPATIENT
Start: 2018-09-27 | End: 2018-09-28

## 2018-09-27 RX ORDER — POLYETHYLENE GLYCOL 3350 17 G/17G
17 POWDER, FOR SOLUTION ORAL DAILY
Status: DISCONTINUED | OUTPATIENT
Start: 2018-09-27 | End: 2018-09-28
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
## 2018-09-27 NOTE — PROGRESS NOTES
[facility] HOSP - [facility]    Progress Note    Mcdonaldedi Macarioana maría Patient Status:  Inpatient    1960 MRN [id number]   Location [facility] 2W/SW Attending Danni Almonte MD   Hosp Day # 5 PCP Amy Morgan MD       Subjective:   Harry Mayers is effusion/hemothorax. Continued left paratracheal soft tissue prominence. Followup studies are advised. Right lung field is grossly clear.      Dictated by (CST): Fadumo Buck MD on 9/27/2018 at 8:35     Approved by (CST): Fadumo Buck MD on 9/27/2018 at lung and there is decrease in size of a left pleural effusion. No pneumothorax. Atelectasis in the left lung base.    Dictated by (CST): Therese Jones MD on 9/24/2018 at 18:46     Approved by (CST): Therese Jones MD on 9/24/2018 at 18:50                  •

## 2018-09-27 NOTE — PROGRESS NOTES
[facility]    Progress Note    Elio Rao Patient Status:  Inpatient    1960 MRN [id number]   Location [facility] 2W/SW Attending Fredo Vera MD   Hosp Day # 5 PCP Omero Kearns MD       Subjective:   Elio Rao is a(n) heparin subcut  Pain medication increased to Norco 10/325 1-2 tabs every 4 hours as needed; lidoderm patch  Increase activity up and ambulate  Expected post-op anemia; hbg stable at 7.6 on iron hemodynamically stable  Discharge Planning; Home in next 24 ho atelectasis. 5. Patchy infiltrate or atelectasis right lung base remainder of the lung field is clear. This is unchanged. 6. Right rib fracture at about the third rib posterior laterally.      Dictated by (CST): Aicha Flowers MD on 9/26/2018 at 9:17

## 2018-09-27 NOTE — CM/SW NOTE
SW received and MDO regarding HHC/RN. Sw  met with patient and discussed benefits of home healthcare. Pt lives alone in house with 2 stairs to enter and then lives on the mail level.  Pt states that his sister who is an RN will be staying with him for 2 wee

## 2018-09-27 NOTE — PLAN OF CARE
- VSS  - requires 02, only 1 L today.   Desats to 86% without while sitting.  - encouraged frequently to use IS to prevent pneumonia  - norco dose increased and will give every 4 hrs   - will transfer to tele    RESPIRATORY - ADULT    • Achieves optimal rufino

## 2018-09-28 VITALS
DIASTOLIC BLOOD PRESSURE: 88 MMHG | OXYGEN SATURATION: 96 % | TEMPERATURE: 98 F | SYSTOLIC BLOOD PRESSURE: 140 MMHG | RESPIRATION RATE: 18 BRPM | BODY MASS INDEX: 36.17 KG/M2 | HEART RATE: 64 BPM | HEIGHT: 74 IN | WEIGHT: 281.81 LBS

## 2018-09-28 PROCEDURE — 99232 SBSQ HOSP IP/OBS MODERATE 35: CPT | Performed by: INTERNAL MEDICINE

## 2018-09-28 PROCEDURE — 99239 HOSP IP/OBS DSCHRG MGMT >30: CPT | Performed by: HOSPITALIST

## 2018-09-28 RX ORDER — LIDOCAINE 50 MG/G
1 PATCH TOPICAL EVERY 24 HOURS
Qty: 30 PATCH | Refills: 0 | Status: SHIPPED | OUTPATIENT
Start: 2018-09-28

## 2018-09-28 RX ORDER — HYDROCODONE BITARTRATE AND ACETAMINOPHEN 10; 325 MG/1; MG/1
1-2 TABLET ORAL EVERY 4 HOURS PRN
Qty: 30 TABLET | Refills: 0 | Status: SHIPPED | OUTPATIENT
Start: 2018-09-28 | End: 2022-01-13

## 2018-09-28 RX ORDER — MELATONIN
325
Qty: 90 TABLET | Refills: 0 | Status: SHIPPED | OUTPATIENT
Start: 2018-09-28 | End: 2022-01-13
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
## 2018-09-28 NOTE — PROGRESS NOTES
Pulmonary/Critical Care Follow Up Note    HPI:   Enzo Boyce is a 59year old male with Patient presents with:  Pain (neurologic)      PCP Dee Dutton MD  Admission Attending Srinath Belcher 15 Day #6    CP better  Mild sob  No fever  No c Intravenous, BID  •  allopurinol (ZYLOPRIM) tab 100 mg, 100 mg, Oral, Daily  •  BuPROPion HCl ER (XL) (WELLBUTRIN XL) 150 MG 24 hr tab 150 mg, 150 mg, Oral, Daily  •  DULoxetine HCl (CYMBALTA) DR particles cap 90 mg, 90 mg, Oral, Daily  •  Levothyroxine So

## 2018-09-28 NOTE — PHYSICAL THERAPY NOTE
PHYSICAL THERAPY TREATMENT NOTE - INPATIENT     Room Number: 320/320-A            Problem List  Principal Problem:    Hemothorax on left  Active Problems:    Closed fracture of multiple ribs of left side with routine healing, subsequent encounter      EDIE supervision, to d/c from acute PT at this time.  PT recommendation home with 24 hr supervision and HHPT upon d/c.     DISCHARGE RECOMMENDATIONS  PT Discharge Recommendations: Home with home health PT;24 hour care/supervision     PLAN  PT Treatment Plan: Bed Climbing 3-5 steps with a railing?: A Little     AM-PAC Score:  Raw Score: 20   PT Approx Degree of Impairment Score: 35.83%   Standardized Score (AM-PAC Scale): 47.67   CMS Modifier (G-Code): CJ    FUNCTIONAL ABILITY STATUS  Gait Assessment   Gait Assista

## 2018-09-28 NOTE — DISCHARGE SUMMARY
[facility] - [facility]    Discharge Summary    Joaquin Males Patient Status:  Inpatient    1960 MRN [id number]   Location [facility] 3W/SW Attending Ivone Schuster MD   [facility] Day # 6 PCP Sean Guzmán MD     Date of Admission: 2018 bowel sounds present  Neuro: No acute focal deficits  MSK: Full range of motion in extremities  Skin: Warm and dry  Psych: Normal affect  Ext: no c/c/e    History of Present Illness: Mikie Jaffe is a(n) 59year old male, with a past medical history signi Pardeep 1997 [address]             Geovanny Alvarez MD In 1 week.     Specialty:  Internal Medicine  Contact information:  [address] [facility] [address]             Sha White MD.    Specialties:  PULMONARY  MG Tabs  Commonly known as:  LYNOX      Take 1 tablet by mouth every 4 (four) hours as needed for Pain. Refills:  0     Pravastatin Sodium 40 MG Tabs  Commonly known as:  PRAVACHOL      nightly.    Refills:  0     VIAGRA 100 MG Tabs  Generic drug:
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
## 2018-10-16 ENCOUNTER — HOSPITAL ENCOUNTER (OUTPATIENT)
Dept: GENERAL RADIOLOGY | Facility: HOSPITAL | Age: 58
Discharge: HOME | End: 2018-10-16
Attending: NURSE PRACTITIONER
Payer: COMMERCIAL

## 2018-10-16 ENCOUNTER — OFFICE VISIT (OUTPATIENT)
Dept: PULMONOLOGY | Facility: CLINIC | Age: 58
End: 2018-10-16
Payer: COMMERCIAL

## 2018-10-16 VITALS
SYSTOLIC BLOOD PRESSURE: 143 MMHG | RESPIRATION RATE: 19 BRPM | HEART RATE: 83 BPM | WEIGHT: 276 LBS | DIASTOLIC BLOOD PRESSURE: 91 MMHG | OXYGEN SATURATION: 99 % | BODY MASS INDEX: 35.42 KG/M2 | HEIGHT: 74 IN

## 2018-10-16 DIAGNOSIS — S22.42XD CLOSED FRACTURE OF MULTIPLE RIBS OF LEFT SIDE WITH ROUTINE HEALING, SUBSEQUENT ENCOUNTER: ICD-10-CM

## 2018-10-16 DIAGNOSIS — J94.2 HEMOTHORAX ON LEFT: ICD-10-CM

## 2018-10-16 DIAGNOSIS — G47.33 OSA (OBSTRUCTIVE SLEEP APNEA): ICD-10-CM

## 2018-10-16 DIAGNOSIS — I71.9 AORTIC ANEURYSM WITHOUT RUPTURE, UNSPECIFIED PORTION OF AORTA (HCC): ICD-10-CM

## 2018-10-16 DIAGNOSIS — J94.2 HEMOTHORAX ON LEFT: Primary | ICD-10-CM

## 2018-10-16 PROCEDURE — 99212 OFFICE O/P EST SF 10 MIN: CPT | Performed by: INTERNAL MEDICINE

## 2018-10-16 PROCEDURE — 71046 X-RAY EXAM CHEST 2 VIEWS: CPT | Performed by: NURSE PRACTITIONER

## 2018-10-16 PROCEDURE — 99214 OFFICE O/P EST MOD 30 MIN: CPT | Performed by: INTERNAL MEDICINE

## 2018-10-16 RX ORDER — ZOLPIDEM TARTRATE 10 MG/1
10 TABLET ORAL NIGHTLY PRN
Qty: 30 TABLET | Refills: 5 | Status: SHIPPED | OUTPATIENT
Start: 2018-10-16

## 2018-10-16 NOTE — PROGRESS NOTES
Dear Amada Stearns,    As you know, Bigg Rogers is a 61-year-old male who I recently evaluated for traumatic rib fractures and massive hemothorax.   I had evaluated him many years ago for obstructive sleep apnea and he has been vigilant with use of his CPAP for many years; literature and zolpidem to be used on an as-needed basis. PROBLEM 3: 3.4 cm infrarenal abdominal aortic aneurysm    RECOMMENDATION: Ultrasound of the abdominal aorta at the one-year interval.    Thanks for inviting me to participate in [address], [facility].     Geraldine Hernandez

## 2018-10-23 PROBLEM — M72.2 PLANTAR FASCIITIS, BILATERAL: Status: ACTIVE | Noted: 2018-10-23

## 2018-10-23 PROBLEM — M21.071 ACQUIRED BILATERAL VALGUS DEFORMITY OF ANKLES: Status: ACTIVE | Noted: 2018-10-23

## 2018-10-23 PROBLEM — M21.072 ACQUIRED BILATERAL VALGUS DEFORMITY OF ANKLES: Status: ACTIVE | Noted: 2018-10-23

## 2018-11-09 ENCOUNTER — TELEPHONE (OUTPATIENT)
Dept: PULMONOLOGY | Facility: CLINIC | Age: 58
End: 2018-11-09

## 2018-11-09 DIAGNOSIS — G47.33 OSA (OBSTRUCTIVE SLEEP APNEA): Primary | ICD-10-CM

## 2018-11-09 NOTE — TELEPHONE ENCOUNTER
Per HME, pt will need a new PSG. Pt last PSG was in 2008. Pt requested a new cpap machine during last office visit. Ok to order PSG?  Please advise

## 2018-11-12 NOTE — TELEPHONE ENCOUNTER
LMTCB. Order for PSG/Titration placed. Pt needs to wait for Prior authorization from [facility] [phone] before scheduling with the Sleep Center [phone].

## 2018-12-06 ENCOUNTER — OFFICE VISIT (OUTPATIENT)
Dept: SLEEP CENTER | Age: 58
End: 2018-12-06
Attending: INTERNAL MEDICINE
Payer: COMMERCIAL

## 2018-12-06 DIAGNOSIS — Z76.89 SLEEP CONCERN: Primary | ICD-10-CM

## 2018-12-06 DIAGNOSIS — G47.33 OSA (OBSTRUCTIVE SLEEP APNEA): ICD-10-CM

## 2018-12-06 PROCEDURE — 95811 POLYSOM 6/>YRS CPAP 4/> PARM: CPT

## 2018-12-06 PROCEDURE — 95810 POLYSOM 6/> YRS 4/> PARAM: CPT

## 2018-12-10 ENCOUNTER — TELEPHONE (OUTPATIENT)
Dept: PULMONOLOGY | Facility: CLINIC | Age: 58
End: 2018-12-10

## 2018-12-10 NOTE — PROCEDURES
[address] [facility]  Accredited by the [facility] of [facility] (AASM)    PATIENT'S NAME: Denisha Dudley   ATTENDING PHYSICIAN: Elza Woodard MD   REFERRING PHYSICIAN: Elza Woodard MD   PATIENT ACCOUNT #: [de-identified] [facility] be determined. The patient spent the entirety of the diagnostic analysis in the right-sided position.   The respiratory event related arousal index is 32 events per hour, and the spontaneous arousal index is 11 events per hour for a combined arousal index CPAP was initiated at 6 CWP and titrated upward to a final value of 8 CWP during which the apnea plus hypopnea index remained 68 events per hour, and the lowest desaturation was to 87%.   Sleep architecture was severely fragmented during the diagnostic

## 2018-12-11 ENCOUNTER — OFFICE VISIT (OUTPATIENT)
Dept: SLEEP CENTER | Age: 58
End: 2018-12-11
Attending: INTERNAL MEDICINE
Payer: COMMERCIAL

## 2018-12-11 ENCOUNTER — ORDER TRANSCRIPTION (OUTPATIENT)
Dept: SLEEP CENTER | Age: 58
End: 2018-12-11

## 2018-12-11 DIAGNOSIS — G47.33 OSA (OBSTRUCTIVE SLEEP APNEA): Primary | ICD-10-CM

## 2018-12-11 DIAGNOSIS — Z76.89 SLEEP CONCERN: Primary | ICD-10-CM

## 2018-12-11 DIAGNOSIS — G47.33 OSA (OBSTRUCTIVE SLEEP APNEA): ICD-10-CM

## 2018-12-11 PROCEDURE — 95811 POLYSOM 6/>YRS CPAP 4/> PARM: CPT

## 2018-12-12 NOTE — TELEPHONE ENCOUNTER
Result Notes for GENERAL SLEEP STUDY TRANSCRIPTION     Notes recorded by Sandrine Chris RN on 12/11/2018 at 4:15 PM CST  Order for CPAP titration placed.  ------    Notes recorded by Lee Davalos MD on 12/10/2018 at 11:12 AM CST  RN, I spoke to t

## 2018-12-12 NOTE — TELEPHONE ENCOUNTER
Per Sarah Rolle nothing further is needed from our office. Explained she may want to cancel rx(s) she entered as there are now 3 rx(s). She voiced understanding.

## 2018-12-13 NOTE — PROCEDURES
[address] [facility]  Accredited by the [place] of Sleep Medicine (AASM)    PATIENT'S NAME: Nicholas Jasso   ATTENDING PHYSICIAN: Gabbie Jama MD   REFERRING PHYSICIAN: Gabbie Jama MD   PATIENT ACCOUNT #: [de-identified] LOCATION: [facility] events per hour, and the spontaneous arousal index is 1.6 events per hour for a combined arousal index of 6.2 events per hour.   There were 327 periodic limb movements for a periodic limb movement index of 54 events per hour of which 3.4 per hour were assoc

## 2018-12-18 ENCOUNTER — TELEPHONE (OUTPATIENT)
Dept: PULMONOLOGY | Facility: CLINIC | Age: 58
End: 2018-12-18

## 2018-12-18 DIAGNOSIS — G47.33 OSA (OBSTRUCTIVE SLEEP APNEA): Primary | ICD-10-CM

## 2018-12-18 NOTE — TELEPHONE ENCOUNTER
Pt informed that CPAP order was sent to [facility], gave pt telephone number to [facility], also informed him to schedule f/u appt 31-90 days after start of CPAP, pt states he will call us back to schedule appt.

## 2018-12-18 NOTE — TELEPHONE ENCOUNTER
----- Message from Phoebe Juares MD sent at 12/14/2018  5:57 PM CST -----  RN,  Please call the patient to facilitate CPAP 13 with appropriate f/u.

## 2019-02-15 ENCOUNTER — TELEPHONE (OUTPATIENT)
Dept: PULMONOLOGY | Facility: CLINIC | Age: 59
End: 2019-02-15

## 2019-02-15 NOTE — TELEPHONE ENCOUNTER
Hi Dr. Holly Samayoa,     I have pended a medical necessity letter for this patient. Insurance is requesting this for the second sleep study the patient had done on 12/11. Please advise.

## 2019-02-22 NOTE — TELEPHONE ENCOUNTER
Dr. Promise Maldonado there is a letter of medical necessity in pt chart pending for your review. If ok please sign letter. We can fax to billing. Thanks.

## 2019-02-26 NOTE — TELEPHONE ENCOUNTER
Letter of medical necessity was faxed. Confirmation sheet received. No further action required at this time.

## 2019-03-01 NOTE — TELEPHONE ENCOUNTER
Pat/Billing states this letter of medical necessity was not received. Please refax to [phone]. She would also like to know if there is a letter of medical necessity for DOS 12/6/18 (code [phone]). If this is available, please fax also.

## 2019-03-01 NOTE — TELEPHONE ENCOUNTER
Letter of medical necessity refaxed to [phone]. Confirmation sheet received. No letter for DOS 12/6/18 noted on pts chart.

## 2019-10-01 ENCOUNTER — TELEPHONE (OUTPATIENT)
Dept: PULMONOLOGY | Facility: CLINIC | Age: 59
End: 2019-10-01

## 2019-12-10 ENCOUNTER — HOSPITAL ENCOUNTER (OUTPATIENT)
Dept: ULTRASOUND IMAGING | Age: 59
Discharge: HOME | End: 2019-12-10
Attending: INTERNAL MEDICINE
Payer: COMMERCIAL

## 2019-12-10 DIAGNOSIS — I71.9 AORTIC ANEURYSM WITHOUT RUPTURE, UNSPECIFIED PORTION OF AORTA (HCC): ICD-10-CM

## 2019-12-10 DIAGNOSIS — G47.33 OSA (OBSTRUCTIVE SLEEP APNEA): ICD-10-CM

## 2019-12-10 PROCEDURE — 76770 US EXAM ABDO BACK WALL COMP: CPT | Performed by: INTERNAL MEDICINE

## 2019-12-13 PROBLEM — M48.02 FORAMINAL STENOSIS OF CERVICAL REGION: Status: ACTIVE | Noted: 2019-12-13

## 2019-12-13 PROBLEM — M54.12 BRACHIAL (CERVICAL) NEURITIS: Status: ACTIVE | Noted: 2019-12-13

## 2020-01-13 PROBLEM — M54.12 LEFT CERVICAL RADICULOPATHY: Status: ACTIVE | Noted: 2020-01-13

## 2020-08-27 NOTE — ADDENDUM NOTE
Addendum  created 09/25/18 0741 by Wendy Razo MD    Sign clinical note Called mother back, she said her GJ tube came all the way out. Mother did have an old gtube at home and went ahead and put that in. Ashley Schreiber is a little small, but she is tolerating feeds at a lower rate. Told mother we would check with IR and let her know a plan for today/tomorrow, but to call if anything changed in the meantime- she agreed.

## 2021-04-09 DIAGNOSIS — Z23 NEED FOR VACCINATION: ICD-10-CM

## 2021-06-18 ENCOUNTER — TELEPHONE (OUTPATIENT)
Dept: PULMONOLOGY | Facility: CLINIC | Age: 61
End: 2021-06-18

## 2021-12-28 ENCOUNTER — HOSPITAL ENCOUNTER (OUTPATIENT)
Dept: ULTRASOUND IMAGING | Facility: HOSPITAL | Age: 61
Discharge: HOME | End: 2021-12-28
Attending: INTERNAL MEDICINE
Payer: COMMERCIAL

## 2021-12-28 ENCOUNTER — HOSPITAL ENCOUNTER (OUTPATIENT)
Dept: GENERAL RADIOLOGY | Facility: HOSPITAL | Age: 61
Discharge: HOME | End: 2021-12-28
Attending: INTERNAL MEDICINE
Payer: COMMERCIAL

## 2021-12-28 ENCOUNTER — HOSPITAL ENCOUNTER (OUTPATIENT)
Dept: CV DIAGNOSTICS | Facility: HOSPITAL | Age: 61
Discharge: HOME | End: 2021-12-28
Attending: INTERNAL MEDICINE
Payer: COMMERCIAL

## 2021-12-28 DIAGNOSIS — I71.4 ABDOMINAL AORTIC ANEURYSM (AAA) WITHOUT RUPTURE (HCC): ICD-10-CM

## 2021-12-28 DIAGNOSIS — R06.00 DYSPNEA ON EXERTION: ICD-10-CM

## 2021-12-28 PROCEDURE — 93306 TTE W/DOPPLER COMPLETE: CPT | Performed by: INTERNAL MEDICINE

## 2021-12-28 PROCEDURE — 76770 US EXAM ABDO BACK WALL COMP: CPT | Performed by: INTERNAL MEDICINE

## 2021-12-28 PROCEDURE — 71046 X-RAY EXAM CHEST 2 VIEWS: CPT | Performed by: INTERNAL MEDICINE

## 2022-01-11 ENCOUNTER — HOSPITAL ENCOUNTER (OUTPATIENT)
Dept: CT IMAGING | Facility: HOSPITAL | Age: 62
Discharge: HOME | End: 2022-01-11
Attending: INTERNAL MEDICINE
Payer: COMMERCIAL

## 2022-01-11 DIAGNOSIS — I71.4 ABDOMINAL AORTIC ANEURYSM (AAA) WITHOUT RUPTURE (HCC): ICD-10-CM

## 2022-01-11 LAB — CREAT BLD-MCNC: 0.6 MG/DL

## 2022-01-11 PROCEDURE — 82565 ASSAY OF CREATININE: CPT

## 2022-01-11 PROCEDURE — 75635 CT ANGIO ABDOMINAL ARTERIES: CPT | Performed by: INTERNAL MEDICINE

## 2022-01-13 ENCOUNTER — OFFICE VISIT (OUTPATIENT)
Dept: PULMONOLOGY | Facility: CLINIC | Age: 62
End: 2022-01-13
Payer: COMMERCIAL

## 2022-01-13 VITALS
HEIGHT: 74 IN | WEIGHT: 304 LBS | HEART RATE: 60 BPM | OXYGEN SATURATION: 99 % | RESPIRATION RATE: 18 BRPM | DIASTOLIC BLOOD PRESSURE: 101 MMHG | SYSTOLIC BLOOD PRESSURE: 171 MMHG | BODY MASS INDEX: 39.01 KG/M2

## 2022-01-13 DIAGNOSIS — R06.00 DYSPNEA ON EXERTION: Primary | ICD-10-CM

## 2022-01-13 PROBLEM — J44.9 CHRONIC OBSTRUCTIVE PULMONARY DISEASE, UNSPECIFIED (HCC): Status: ACTIVE | Noted: 2022-01-13

## 2022-01-13 PROCEDURE — 3080F DIAST BP >= 90 MM HG: CPT | Performed by: INTERNAL MEDICINE

## 2022-01-13 PROCEDURE — 3008F BODY MASS INDEX DOCD: CPT | Performed by: INTERNAL MEDICINE

## 2022-01-13 PROCEDURE — 99203 OFFICE O/P NEW LOW 30 MIN: CPT | Performed by: INTERNAL MEDICINE

## 2022-01-13 PROCEDURE — 3077F SYST BP >= 140 MM HG: CPT | Performed by: INTERNAL MEDICINE

## 2022-01-13 RX ORDER — AZITHROMYCIN 250 MG/1
TABLET, FILM COATED ORAL
Qty: 6 TABLET | Refills: 0 | Status: SHIPPED | OUTPATIENT
Start: 2022-01-13 | End: 2022-01-18

## 2022-01-13 RX ORDER — FLUTICASONE FUROATE AND VILANTEROL TRIFENATATE 100; 25 UG/1; UG/1
1 POWDER RESPIRATORY (INHALATION) DAILY
Qty: 1 EACH | Refills: 6 | Status: SHIPPED | OUTPATIENT
Start: 2022-01-13

## 2022-01-13 NOTE — PROGRESS NOTES
Dear Tete Montes,    As you know, Estephania Nicole is a 78-year-old male who I am now seeing in follow-up for dyspnea on exertion and sleep apnea as I had assisted with his sleep apnea in the distant past and had seen him more recently a few years ago when he had a hemothorax appointment with cardiology and would consider further diagnostics with stress testing    PROBLEM 2: Obstructive sleep apnea–download today is pretty good with average daily usage 4 hours and 26 minutes and residual events 4/h.     RECOMMENDATION: Vigilance

## 2022-03-23 RX ORDER — ROSUVASTATIN CALCIUM 40 MG/1
20 TABLET, COATED ORAL NIGHTLY
COMMUNITY

## 2022-03-26 ENCOUNTER — LAB ENCOUNTER (OUTPATIENT)
Dept: LAB | Facility: HOSPITAL | Age: 62
End: 2022-03-26
Attending: INTERNAL MEDICINE
Payer: COMMERCIAL

## 2022-03-26 ENCOUNTER — HOSPITAL ENCOUNTER (OUTPATIENT)
Dept: GENERAL RADIOLOGY | Facility: HOSPITAL | Age: 62
Discharge: HOME | End: 2022-03-26
Attending: INTERNAL MEDICINE
Payer: COMMERCIAL

## 2022-03-26 DIAGNOSIS — Z01.818 PRE-OP TESTING: ICD-10-CM

## 2022-03-26 LAB
ANION GAP SERPL CALC-SCNC: 8 MMOL/L (ref 0–18)
BUN BLD-MCNC: 11 MG/DL (ref 7–18)
BUN/CREAT SERPL: 15.9 (ref 10–20)
CALCIUM BLD-MCNC: 9.5 MG/DL (ref 8.5–10.1)
CHLORIDE SERPL-SCNC: 104 MMOL/L (ref 98–112)
CO2 SERPL-SCNC: 28 MMOL/L (ref 21–32)
CREAT BLD-MCNC: 0.69 MG/DL
DEPRECATED RDW RBC AUTO: 57.9 FL (ref 35.1–46.3)
ERYTHROCYTE [DISTWIDTH] IN BLOOD BY AUTOMATED COUNT: 17 % (ref 11–15)
FASTING STATUS PATIENT QL REPORTED: YES
GLUCOSE BLD-MCNC: 157 MG/DL (ref 70–99)
HCT VFR BLD AUTO: 39.7 %
HGB BLD-MCNC: 12.4 G/DL
INR BLD: 0.94 (ref 0.8–1.2)
MCH RBC QN AUTO: 28.9 PG (ref 26–34)
MCHC RBC AUTO-ENTMCNC: 31.2 G/DL (ref 31–37)
MCV RBC AUTO: 92.5 FL
OSMOLALITY SERPL CALC.SUM OF ELEC: 293 MOSM/KG (ref 275–295)
PLATELET # BLD AUTO: 208 10(3)UL (ref 150–450)
POTASSIUM SERPL-SCNC: 4 MMOL/L (ref 3.5–5.1)
PROTHROMBIN TIME: 12.7 SECONDS (ref 11.6–14.8)
RBC # BLD AUTO: 4.29 X10(6)UL
SARS-COV-2 RNA RESP QL NAA+PROBE: NOT DETECTED
SODIUM SERPL-SCNC: 140 MMOL/L (ref 136–145)
WBC # BLD AUTO: 8.2 X10(3) UL (ref 4–11)

## 2022-03-26 PROCEDURE — 85610 PROTHROMBIN TIME: CPT

## 2022-03-26 PROCEDURE — 85027 COMPLETE CBC AUTOMATED: CPT

## 2022-03-26 PROCEDURE — 71046 X-RAY EXAM CHEST 2 VIEWS: CPT | Performed by: INTERNAL MEDICINE

## 2022-03-26 PROCEDURE — 80048 BASIC METABOLIC PNL TOTAL CA: CPT

## 2022-03-26 PROCEDURE — 36415 COLL VENOUS BLD VENIPUNCTURE: CPT

## 2022-03-29 ENCOUNTER — APPOINTMENT (OUTPATIENT)
Dept: CV DIAGNOSTICS | Facility: HOSPITAL | Age: 62
End: 2022-03-29
Attending: INTERNAL MEDICINE
Payer: COMMERCIAL

## 2022-03-29 ENCOUNTER — HOSPITAL ENCOUNTER (OUTPATIENT)
Dept: INTERVENTIONAL RADIOLOGY/VASCULAR | Facility: HOSPITAL | Age: 62
Setting detail: OBSERVATION
Discharge: HOME | End: 2022-03-30
Attending: INTERNAL MEDICINE | Admitting: INTERNAL MEDICINE
Payer: COMMERCIAL

## 2022-03-29 DIAGNOSIS — Z01.818 PRE-OP TESTING: Primary | ICD-10-CM

## 2022-03-29 DIAGNOSIS — R94.39 ABNORMAL NUCLEAR STRESS TEST: ICD-10-CM

## 2022-03-29 DIAGNOSIS — R06.00 DOE (DYSPNEA ON EXERTION): ICD-10-CM

## 2022-03-29 PROBLEM — I25.10 CAD (CORONARY ARTERY DISEASE): Status: ACTIVE | Noted: 2022-03-29

## 2022-03-29 LAB — ISTAT ACTIVATED CLOTTING TIME: 190 SECONDS (ref 125–137)

## 2022-03-29 PROCEDURE — B240ZZ3 ULTRASONOGRAPHY OF SINGLE CORONARY ARTERY, INTRAVASCULAR: ICD-10-PCS | Performed by: INTERNAL MEDICINE

## 2022-03-29 PROCEDURE — 93306 TTE W/DOPPLER COMPLETE: CPT | Performed by: INTERNAL MEDICINE

## 2022-03-29 PROCEDURE — 4A023N7 MEASUREMENT OF CARDIAC SAMPLING AND PRESSURE, LEFT HEART, PERCUTANEOUS APPROACH: ICD-10-PCS | Performed by: INTERNAL MEDICINE

## 2022-03-29 PROCEDURE — 99220 INITIAL OBSERVATION CARE,LEVL III: CPT | Performed by: HOSPITALIST

## 2022-03-29 PROCEDURE — 027037Z DILATION OF CORONARY ARTERY, ONE ARTERY WITH FOUR OR MORE DRUG-ELUTING INTRALUMINAL DEVICES, PERCUTANEOUS APPROACH: ICD-10-PCS | Performed by: INTERNAL MEDICINE

## 2022-03-29 PROCEDURE — B2111ZZ FLUOROSCOPY OF MULTIPLE CORONARY ARTERIES USING LOW OSMOLAR CONTRAST: ICD-10-PCS | Performed by: INTERNAL MEDICINE

## 2022-03-29 RX ORDER — NITROGLYCERIN 20 MG/100ML
INJECTION INTRAVENOUS
Status: COMPLETED
Start: 2022-03-29 | End: 2022-03-29

## 2022-03-29 RX ORDER — ALLOPURINOL 100 MG/1
100 TABLET ORAL NIGHTLY
Status: DISCONTINUED | OUTPATIENT
Start: 2022-03-29 | End: 2022-03-30

## 2022-03-29 RX ORDER — VERAPAMIL HYDROCHLORIDE 2.5 MG/ML
INJECTION, SOLUTION INTRAVENOUS
Status: COMPLETED
Start: 2022-03-29 | End: 2022-03-29

## 2022-03-29 RX ORDER — ACETAMINOPHEN 325 MG/1
650 TABLET ORAL EVERY 6 HOURS PRN
Status: DISCONTINUED | OUTPATIENT
Start: 2022-03-29 | End: 2022-03-30

## 2022-03-29 RX ORDER — HYDROCODONE BITARTRATE AND ACETAMINOPHEN 5; 325 MG/1; MG/1
2 TABLET ORAL EVERY 4 HOURS PRN
Status: DISCONTINUED | OUTPATIENT
Start: 2022-03-29 | End: 2022-03-30

## 2022-03-29 RX ORDER — NITROGLYCERIN 0.4 MG/1
TABLET SUBLINGUAL
Status: COMPLETED
Start: 2022-03-29 | End: 2022-03-29

## 2022-03-29 RX ORDER — LIDOCAINE HYDROCHLORIDE 20 MG/ML
INJECTION, SOLUTION EPIDURAL; INFILTRATION; INTRACAUDAL; PERINEURAL
Status: COMPLETED
Start: 2022-03-29 | End: 2022-03-29

## 2022-03-29 RX ORDER — ASPIRIN 81 MG/1
81 TABLET ORAL DAILY
Status: CANCELLED | OUTPATIENT
Start: 2022-03-30

## 2022-03-29 RX ORDER — LISINOPRIL 10 MG/1
10 TABLET ORAL NIGHTLY
Status: DISCONTINUED | OUTPATIENT
Start: 2022-03-29 | End: 2022-03-29

## 2022-03-29 RX ORDER — CETIRIZINE HYDROCHLORIDE 10 MG/1
10 TABLET ORAL DAILY
Status: DISCONTINUED | OUTPATIENT
Start: 2022-03-29 | End: 2022-03-30

## 2022-03-29 RX ORDER — PROCHLORPERAZINE EDISYLATE 5 MG/ML
5 INJECTION INTRAMUSCULAR; INTRAVENOUS EVERY 8 HOURS PRN
Status: DISCONTINUED | OUTPATIENT
Start: 2022-03-29 | End: 2022-03-30

## 2022-03-29 RX ORDER — HEPARIN SODIUM 1000 [USP'U]/ML
INJECTION, SOLUTION INTRAVENOUS; SUBCUTANEOUS
Status: COMPLETED
Start: 2022-03-29 | End: 2022-03-29

## 2022-03-29 RX ORDER — MIDAZOLAM HYDROCHLORIDE 1 MG/ML
INJECTION INTRAMUSCULAR; INTRAVENOUS
Status: DISCONTINUED
Start: 2022-03-29 | End: 2022-03-29 | Stop reason: WASHOUT

## 2022-03-29 RX ORDER — SODIUM CHLORIDE 9 MG/ML
INJECTION, SOLUTION INTRAVENOUS CONTINUOUS
Status: ACTIVE | OUTPATIENT
Start: 2022-03-29 | End: 2022-03-29

## 2022-03-29 RX ORDER — ECHINACEA PURPUREA EXTRACT 125 MG
1 TABLET ORAL
Status: DISCONTINUED | OUTPATIENT
Start: 2022-03-29 | End: 2022-03-30

## 2022-03-29 RX ORDER — LISINOPRIL 10 MG/1
10 TABLET ORAL NIGHTLY
Status: DISCONTINUED | OUTPATIENT
Start: 2022-03-30 | End: 2022-03-30

## 2022-03-29 RX ORDER — SODIUM CHLORIDE 9 MG/ML
INJECTION, SOLUTION INTRAVENOUS
Status: COMPLETED | OUTPATIENT
Start: 2022-03-29 | End: 2022-03-29

## 2022-03-29 RX ORDER — LISINOPRIL 10 MG/1
10 TABLET ORAL ONCE
Status: COMPLETED | OUTPATIENT
Start: 2022-03-29 | End: 2022-03-29

## 2022-03-29 RX ORDER — ASPIRIN 81 MG/1
81 TABLET, CHEWABLE ORAL DAILY
Status: DISCONTINUED | OUTPATIENT
Start: 2022-03-30 | End: 2022-03-30

## 2022-03-29 RX ORDER — ACETAMINOPHEN 325 MG/1
650 TABLET ORAL EVERY 4 HOURS PRN
Status: DISCONTINUED | OUTPATIENT
Start: 2022-03-29 | End: 2022-03-30

## 2022-03-29 RX ORDER — ASPIRIN 81 MG/1
81 TABLET ORAL DAILY
Status: DISCONTINUED | OUTPATIENT
Start: 2022-03-30 | End: 2022-03-29

## 2022-03-29 RX ORDER — LEVOTHYROXINE SODIUM 88 UG/1
88 TABLET ORAL
Status: DISCONTINUED | OUTPATIENT
Start: 2022-03-29 | End: 2022-03-30

## 2022-03-29 RX ORDER — TEMAZEPAM 15 MG/1
15 CAPSULE ORAL NIGHTLY PRN
Status: DISCONTINUED | OUTPATIENT
Start: 2022-03-29 | End: 2022-03-30

## 2022-03-29 RX ORDER — BUPROPION HYDROCHLORIDE 150 MG/1
150 TABLET ORAL NIGHTLY
Status: DISCONTINUED | OUTPATIENT
Start: 2022-03-29 | End: 2022-03-30

## 2022-03-29 RX ORDER — ONDANSETRON 2 MG/ML
4 INJECTION INTRAMUSCULAR; INTRAVENOUS EVERY 6 HOURS PRN
Status: DISCONTINUED | OUTPATIENT
Start: 2022-03-29 | End: 2022-03-30

## 2022-03-29 RX ORDER — PANTOPRAZOLE SODIUM 40 MG/1
40 TABLET, DELAYED RELEASE ORAL
Status: DISCONTINUED | OUTPATIENT
Start: 2022-03-30 | End: 2022-03-30

## 2022-03-29 RX ORDER — MIDAZOLAM HYDROCHLORIDE 1 MG/ML
INJECTION INTRAMUSCULAR; INTRAVENOUS
Status: COMPLETED
Start: 2022-03-29 | End: 2022-03-29

## 2022-03-29 RX ORDER — HYDROCODONE BITARTRATE AND ACETAMINOPHEN 5; 325 MG/1; MG/1
1 TABLET ORAL EVERY 4 HOURS PRN
Status: DISCONTINUED | OUTPATIENT
Start: 2022-03-29 | End: 2022-03-30

## 2022-03-29 RX ORDER — ROSUVASTATIN CALCIUM 20 MG/1
20 TABLET, COATED ORAL NIGHTLY
Status: DISCONTINUED | OUTPATIENT
Start: 2022-03-29 | End: 2022-03-30

## 2022-03-29 RX ADMIN — SODIUM CHLORIDE: 9 INJECTION, SOLUTION INTRAVENOUS at 14:33:00

## 2022-03-29 RX ADMIN — SODIUM CHLORIDE: 9 INJECTION, SOLUTION INTRAVENOUS at 10:30:00

## 2022-03-29 RX ADMIN — CETIRIZINE HYDROCHLORIDE 10 MG: 10 TABLET ORAL at 18:28:00

## 2022-03-29 RX ADMIN — ACETAMINOPHEN 650 MG: 325 TABLET ORAL at 14:49:00

## 2022-03-29 NOTE — PROCEDURES
[facility]    Cardiac Cath Procedure Note  Lela Caruso Patient Status:  Outpatient in a Bed    1960 MRN [id number]   Location [facility] Attending Noris Juarez MD   Hosp Day # 0 PCP Genesis Alexandre MD       Cardiologist: Rhona Lucio MD  Primary Proceduralist: Rhona Lucio MD  Procedure Performed: LHC, LV and IVUS and PCI RCA  Date of Procedure: 3/29/2022   Indication: Positive stress test    Summary of procedure: Focal mid RCA stenosis status post ADALI x1 complicated by anterograde and retrograde dissection requiring \"full metal jacket\" of the proximal to distal RCA. Total 4 drug-eluting stents placed. Left Ventriculography and hemodynamics:   LV EF not done  LV EDP 5 mmHg, 1 L fluid bolus given  No gradient across aortic valve        Coronary Angiography  RCA:  Dominant, large vessel 5 to 6 mm. 99% mid RCA stenosis. Large distal runoff to the PDA and PL system. Left main:  Free of obstructive disease    Left anterior descending:  Free of obstructive disease, supplies diagonals which are non-obstructive    Circumflex:  Free of obstructive disease, supplies OM branches which are patent      RCA intervention  Lesion Characteristics- severely torturous, mildly calcified. Type non-C lesion. Pre-intervention stenosis 99%, Post intervention stenosis 0%. Pre GRACE 3, Post GRACE 3.      Guide Catheter: Hockey stick  Wire: Run through   Pre-dil:  4.0 x 15 mm compliant balloon. After pre-dil there was dissection both antegrade and retrograde therefore stent placed somewhat distal to cover antegrade dissection. Stent:  5 x 12 mm Jeremy ADALI at 14 umberto  After stent deployment spiral dissection both antegrade and retrograde with extensive dye staining initially concerning for large perforation however hemodynamically patient remained stable.   Quickly removed stent catheter, placed guide liner to the mid vessel, used 4 x 20 mm compliant balloon to balloon assist track guide liner to the mid/distal vessel. Angiogram done from guide extension to identify any distal perforation, extent of antegrade dissection. Vandemere as though stenting the dissection first prior to placing covered stent or surgery would be okay. Stent: 4 x 38 mm Cameron ADALI at 16 umberto at the distal to 5 mm stent  Stent: 4 x 26 mm Cameron ADALI at 18 umberto proximal to 5 mm stent  There seem to be extension of antegrade dissection distal into the the distal RCA  Use new 4 x 15 mm balloon to balloon assist track guide liner to the distal RCA to deliver stent  Stent: 4 x 18 mm Jeremy ADALI at 14 umberto  Post-dil: 5 x 12 mm compliant balloon to the entirety of the RCA  Intravascular ultrasound: Would not track to the distal vessel however the majority of the stented segment of the RCA was visualized. All visualized segments with adequately expanded stent and adequately opposed struts. There was dye staining at the ostium of the RCA which based on intravascular ultrasound demonstrated hematoma trapped behind the stent. Stent extended to the ostium of the RCA. Assessment:  CAD status post PCI the RCA complicated by stent edge dissection, status post full metal jacket of the RCA  No pericardial effusion at end of the case based on bedside echo  Preserved ejection fraction  AAA, 4.5 cm      Recommendations:  DAPT: Aspirin and Brilinta  Echocardiogram in a couple hours today, observe overnight      Summary of Case: After written informed consent was obtained from the patient, patient was brought to the cardiac catheterization laboratory. Patient was prepped and draped in the usual sterile fashion. Lidocaine 1% was used to infiltrate the right radial artery for local anesthesia and a 6 Malawian introducer sheath was inserted into the right radial artery. Selective coronary angiography performed with JR4 catheter for RCA and JL3.5 catheter for LCA. Angiography performed in standard projections.       6 Korean JR4 catheter placed in LV for hemodynamics. Specimen sent to: No specimen collected  Estimated blood loss: 10 cc  Closure:  TR band      IV was maintained by RN and moderate conscious sedation of versed and fentanyl was given. Patient was assessed and monitoring of oxygen, heart rate and blood pressure by nurse and myself during the exam 45 minutes.       Emmanuel Rose MD  03/29/22

## 2022-03-29 NOTE — IVS NOTE
EKG completed       3/29 1308 Hand-Off     Procedure hand off report given to [facility]. Pt's vital signs are stable. Procedural access site is dry and intact with no signs and symptoms of bleeding and hematoma. Bedrest status x 4 hrs   Dr. Lisset Mathias and Dr. Cheryl Cee  spoke with patient post procedure.        3/29 1340   Bedside handoff to [facility]   Pt denies any pain or discomfort  Procedural site dry and intact, no bleeding or swelling noted  Activity restriction and bedrest status reviewed  Pt transferred with belongings     Repeat Echo @ 1500 and tomorrow 3/30

## 2022-03-29 NOTE — INTERVAL H&P NOTE
Pre-op Diagnosis: * No pre-op diagnosis entered *    The above referenced H&P was reviewed by Cristo Fong MD on 3/29/2022, the patient was examined and no significant changes have occurred in the patient's condition since the H&P was performed. I discussed with the patient and/or legal representative the potential benefits, risks and side effects of this procedure; the likelihood of the patient achieving goals; and potential problems that might occur during recuperation. I discussed reasonable alternatives to the procedure, including risks, benefits and side effects related to the alternatives and risks related to not receiving this procedure. We will proceed with procedure as planned.
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
## 2022-03-29 NOTE — RESPIRATORY THERAPY NOTE
FRANCESCA ASSESSMENT:    Pt does have a previous diagnosis of FRANCESCA. Pt does routinely use a CPAP device at home. CPAP INITIATION:    Pt to be placed on CPAP: yes  Pt refused: no  CPAP settings: CPAP of 13 using at home per patient.   Interface: Nasal pillow

## 2022-03-30 ENCOUNTER — APPOINTMENT (OUTPATIENT)
Dept: CV DIAGNOSTICS | Facility: HOSPITAL | Age: 62
End: 2022-03-30
Attending: INTERNAL MEDICINE
Payer: COMMERCIAL

## 2022-03-30 VITALS
WEIGHT: 309.81 LBS | RESPIRATION RATE: 18 BRPM | HEART RATE: 61 BPM | BODY MASS INDEX: 39.76 KG/M2 | TEMPERATURE: 98 F | DIASTOLIC BLOOD PRESSURE: 90 MMHG | OXYGEN SATURATION: 97 % | HEIGHT: 74 IN | SYSTOLIC BLOOD PRESSURE: 149 MMHG

## 2022-03-30 LAB
ANION GAP SERPL CALC-SCNC: 5 MMOL/L (ref 0–18)
BASOPHILS # BLD AUTO: 0.03 X10(3) UL (ref 0–0.2)
BASOPHILS NFR BLD AUTO: 0.3 %
BUN BLD-MCNC: 8 MG/DL (ref 7–18)
BUN/CREAT SERPL: 11.4 (ref 10–20)
CALCIUM BLD-MCNC: 9.7 MG/DL (ref 8.5–10.1)
CHLORIDE SERPL-SCNC: 104 MMOL/L (ref 98–112)
CO2 SERPL-SCNC: 30 MMOL/L (ref 21–32)
CREAT BLD-MCNC: 0.7 MG/DL
DEPRECATED RDW RBC AUTO: 57 FL (ref 35.1–46.3)
EOSINOPHIL # BLD AUTO: 0.37 X10(3) UL (ref 0–0.7)
EOSINOPHIL NFR BLD AUTO: 4.2 %
ERYTHROCYTE [DISTWIDTH] IN BLOOD BY AUTOMATED COUNT: 16.6 % (ref 11–15)
GLUCOSE BLD-MCNC: 135 MG/DL (ref 70–99)
HCT VFR BLD AUTO: 40.3 %
HGB BLD-MCNC: 12.6 G/DL
IMM GRANULOCYTES # BLD AUTO: 0.06 X10(3) UL (ref 0–1)
IMM GRANULOCYTES NFR BLD: 0.7 %
LYMPHOCYTES # BLD AUTO: 1.5 X10(3) UL (ref 1–4)
LYMPHOCYTES NFR BLD AUTO: 16.9 %
MCH RBC QN AUTO: 29 PG (ref 26–34)
MCHC RBC AUTO-ENTMCNC: 31.3 G/DL (ref 31–37)
MCV RBC AUTO: 92.9 FL
MONOCYTES # BLD AUTO: 0.57 X10(3) UL (ref 0.1–1)
MONOCYTES NFR BLD AUTO: 6.4 %
NEUTROPHILS # BLD AUTO: 6.33 X10 (3) UL (ref 1.5–7.7)
NEUTROPHILS # BLD AUTO: 6.33 X10(3) UL (ref 1.5–7.7)
NEUTROPHILS NFR BLD AUTO: 71.5 %
OSMOLALITY SERPL CALC.SUM OF ELEC: 288 MOSM/KG (ref 275–295)
PLATELET # BLD AUTO: 181 10(3)UL (ref 150–450)
POTASSIUM SERPL-SCNC: 4.1 MMOL/L (ref 3.5–5.1)
RBC # BLD AUTO: 4.34 X10(6)UL
SODIUM SERPL-SCNC: 139 MMOL/L (ref 136–145)
WBC # BLD AUTO: 8.9 X10(3) UL (ref 4–11)

## 2022-03-30 PROCEDURE — 93308 TTE F-UP OR LMTD: CPT | Performed by: INTERNAL MEDICINE

## 2022-03-30 PROCEDURE — 99217 OBSERVATION CARE DISCHARGE: CPT | Performed by: HOSPITALIST

## 2022-03-30 RX ORDER — ASPIRIN 81 MG/1
81 TABLET, CHEWABLE ORAL DAILY
Qty: 30 TABLET | Refills: 11 | Status: SHIPPED | OUTPATIENT
Start: 2022-03-31

## 2022-03-30 RX ADMIN — CETIRIZINE HYDROCHLORIDE 10 MG: 10 TABLET ORAL at 08:34:00

## 2022-03-30 RX ADMIN — ASPIRIN 81 MG: 81 TABLET, CHEWABLE ORAL at 08:34:00

## 2022-03-30 RX ADMIN — PANTOPRAZOLE SODIUM 40 MG: 40 TABLET, DELAYED RELEASE ORAL at 04:54:00

## 2022-03-30 RX ADMIN — LEVOTHYROXINE SODIUM 88 MCG: 88 TABLET ORAL at 04:54:00

## 2022-03-30 NOTE — PLAN OF CARE
Problem: Patient Centered Care  Goal: Patient preferences are identified and integrated in the patient's plan of care  Description: Interventions:  - What would you like us to know as we care for you?  I wear a CPAP at night  - Provide timely, complete, and accurate information to patient/family  - Incorporate patient and family knowledge, values, beliefs, and cultural backgrounds into the planning and delivery of care  - Encourage patient/family to participate in care and decision-making at the level they choose  - Honor patient and family perspectives and choices  3/30/2022 1616 by Jameel Wolf RN  Outcome: Completed  3/30/2022 1344 by Jameel Wolf RN  Outcome: Progressing     Problem: RESPIRATORY - ADULT  Goal: Achieves optimal ventilation and oxygenation  Description: INTERVENTIONS:  - Assess for changes in respiratory status  - Assess for changes in mentation and behavior  - Position to facilitate oxygenation and minimize respiratory effort  - Oxygen supplementation based on oxygen saturation or ABGs  - Provide Smoking Cessation handout, if applicable  - Encourage broncho-pulmonary hygiene including cough, deep breathe, Incentive Spirometry  - Assess the need for suctioning and perform as needed  - Assess and instruct to report SOB or any respiratory difficulty  - Respiratory Therapy support as indicated  - Manage/alleviate anxiety  - Monitor for signs/symptoms of CO2 retention  3/30/2022 1616 by Jameel Wolf RN  Outcome: Completed  3/30/2022 1344 by Jameel Wolf RN  Outcome: Progressing     Problem: METABOLIC/FLUID AND ELECTROLYTES - ADULT  Goal: Electrolytes maintained within normal limits  Description: INTERVENTIONS:  - Monitor labs and rhythm and assess patient for signs and symptoms of electrolyte imbalances  - Administer electrolyte replacement as ordered  - Monitor response to electrolyte replacements, including rhythm and repeat lab results as appropriate  - Fluid restriction as ordered  - Instruct patient on fluid and nutrition restrictions as appropriate  3/30/2022 1616 by Bernadine Gil RN  Outcome: Completed  3/30/2022 1344 by Bernadine Gil RN  Outcome: Progressing  Goal: Hemodynamic stability and optimal renal function maintained  Description: INTERVENTIONS:  - Monitor labs and assess for signs and symptoms of volume excess or deficit  - Monitor intake, output and patient weight  - Monitor urine specific gravity, serum osmolarity and serum sodium as indicated or ordered  - Monitor response to interventions for patient's volume status, including labs, urine output, blood pressure (other measures as available)  - Encourage oral intake as appropriate  - Instruct patient on fluid and nutrition restrictions as appropriate  3/30/2022 1616 by Bernadine Gil RN  Outcome: Completed  3/30/2022 1344 by Bernadine Gil RN  Outcome: Progressing     Problem: SKIN/TISSUE INTEGRITY - ADULT  Goal: Incision(s), wounds(s) or drain site(s) healing without S/S of infection  Description: INTERVENTIONS:  - Assess and document risk factors for pressure ulcer development  - Assess and document skin integrity  - Assess and document dressing/incision, wound bed, drain sites and surrounding tissue  - Implement wound care per orders  - Initiate isolation precautions as appropriate  - Initiate Pressure Ulcer prevention bundle as indicated  3/30/2022 1616 by Bernadine Gil RN  Outcome: Completed  3/30/2022 1344 by Bernadine Gil RN  Outcome: Progressing     Problem: Patient Centered Care  Goal: Patient preferences are identified and integrated in the patient's plan of care  Description: Interventions:  - What would you like us to know as we care for you?  I wear a CPAP at night  - Provide timely, complete, and accurate information to patient/family  - Incorporate patient and family knowledge, values, beliefs, and cultural backgrounds into the planning and delivery of care  - Encourage patient/family to participate in care and decision-making at the level they choose  - Honor patient and family perspectives and choices  3/30/2022 1616 by Ronel Romero RN  Outcome: Completed  3/30/2022 1344 by Ronel Romero RN  Outcome: Progressing     Problem: RESPIRATORY - ADULT  Goal: Achieves optimal ventilation and oxygenation  Description: INTERVENTIONS:  - Assess for changes in respiratory status  - Assess for changes in mentation and behavior  - Position to facilitate oxygenation and minimize respiratory effort  - Oxygen supplementation based on oxygen saturation or ABGs  - Provide Smoking Cessation handout, if applicable  - Encourage broncho-pulmonary hygiene including cough, deep breathe, Incentive Spirometry  - Assess the need for suctioning and perform as needed  - Assess and instruct to report SOB or any respiratory difficulty  - Respiratory Therapy support as indicated  - Manage/alleviate anxiety  - Monitor for signs/symptoms of CO2 retention  3/30/2022 1616 by Ronel Romero RN  Outcome: Completed  3/30/2022 1344 by Ronel Romero RN  Outcome: Progressing     Problem: METABOLIC/FLUID AND ELECTROLYTES - ADULT  Goal: Electrolytes maintained within normal limits  Description: INTERVENTIONS:  - Monitor labs and rhythm and assess patient for signs and symptoms of electrolyte imbalances  - Administer electrolyte replacement as ordered  - Monitor response to electrolyte replacements, including rhythm and repeat lab results as appropriate  - Fluid restriction as ordered  - Instruct patient on fluid and nutrition restrictions as appropriate  3/30/2022 1616 by Ronel Romero RN  Outcome: Completed  3/30/2022 1344 by Ronel Romero RN  Outcome: Progressing  Goal: Hemodynamic stability and optimal renal function maintained  Description: INTERVENTIONS:  - Monitor labs and assess for signs and symptoms of volume excess or deficit  - Monitor intake, output and patient weight  - Monitor urine specific gravity, serum osmolarity and serum sodium as indicated or ordered  - Monitor response to interventions for patient's volume status, including labs, urine output, blood pressure (other measures as available)  - Encourage oral intake as appropriate  - Instruct patient on fluid and nutrition restrictions as appropriate  3/30/2022 1616 by Bryan Soares RN  Outcome: Completed  3/30/2022 1344 by Bryan Soares RN  Outcome: Progressing     Problem: SKIN/TISSUE INTEGRITY - ADULT  Goal: Incision(s), wounds(s) or drain site(s) healing without S/S of infection  Description: INTERVENTIONS:  - Assess and document risk factors for pressure ulcer development  - Assess and document skin integrity  - Assess and document dressing/incision, wound bed, drain sites and surrounding tissue  - Implement wound care per orders  - Initiate isolation precautions as appropriate  - Initiate Pressure Ulcer prevention bundle as indicated  3/30/2022 1616 by Bryan Soares RN  Outcome: Completed  3/30/2022 1344 by Bryan Soares RN  Outcome: Progressing

## 2022-03-30 NOTE — PLAN OF CARE
Problem: Patient Centered Care  Goal: Patient preferences are identified and integrated in the patient's plan of care  Description: Interventions:  - What would you like us to know as we care for you?  I wear a CPAP at night  - Provide timely, complete, and accurate information to patient/family  - Incorporate patient and family knowledge, values, beliefs, and cultural backgrounds into the planning and delivery of care  - Encourage patient/family to participate in care and decision-making at the level they choose  - Honor patient and family perspectives and choices  Outcome: Progressing     Problem: RESPIRATORY - ADULT  Goal: Achieves optimal ventilation and oxygenation  Description: INTERVENTIONS:  - Assess for changes in respiratory status  - Assess for changes in mentation and behavior  - Position to facilitate oxygenation and minimize respiratory effort  - Oxygen supplementation based on oxygen saturation or ABGs  - Provide Smoking Cessation handout, if applicable  - Encourage broncho-pulmonary hygiene including cough, deep breathe, Incentive Spirometry  - Assess the need for suctioning and perform as needed  - Assess and instruct to report SOB or any respiratory difficulty  - Respiratory Therapy support as indicated  - Manage/alleviate anxiety  - Monitor for signs/symptoms of CO2 retention  Outcome: Progressing     Problem: METABOLIC/FLUID AND ELECTROLYTES - ADULT  Goal: Electrolytes maintained within normal limits  Description: INTERVENTIONS:  - Monitor labs and rhythm and assess patient for signs and symptoms of electrolyte imbalances  - Administer electrolyte replacement as ordered  - Monitor response to electrolyte replacements, including rhythm and repeat lab results as appropriate  - Fluid restriction as ordered  - Instruct patient on fluid and nutrition restrictions as appropriate  Outcome: Progressing  Goal: Hemodynamic stability and optimal renal function maintained  Description: INTERVENTIONS:  - Monitor labs and assess for signs and symptoms of volume excess or deficit  - Monitor intake, output and patient weight  - Monitor urine specific gravity, serum osmolarity and serum sodium as indicated or ordered  - Monitor response to interventions for patient's volume status, including labs, urine output, blood pressure (other measures as available)  - Encourage oral intake as appropriate  - Instruct patient on fluid and nutrition restrictions as appropriate  Outcome: Progressing     Problem: SKIN/TISSUE INTEGRITY - ADULT  Goal: Incision(s), wounds(s) or drain site(s) healing without S/S of infection  Description: INTERVENTIONS:  - Assess and document risk factors for pressure ulcer development  - Assess and document skin integrity  - Assess and document dressing/incision, wound bed, drain sites and surrounding tissue  - Implement wound care per orders  - Initiate isolation precautions as appropriate  - Initiate Pressure Ulcer prevention bundle as indicated  Outcome: Progressing    S/P right radial cardiac cath  - site soft/no hematoma/ bandaid CDI  Patient C/O SOB while falling asleep - O2 sat 97-99%.    CPAP ordered and respiratory notified  Pt C/O nasal congestion - Zyrtec and nasal spray ordered  Elevated BP post cath - Lisinopril started this evening

## 2022-03-30 NOTE — CARDIAC REHAB
Cardiac Rehab Phase I    Activity:  Distance   Assistance needed   Patient tolerated activity . Education:  Handouts provided and reviewed: [address] Diet: Healthy Cardiac diet reviewed. Disease Process: Disease process reviewed. Reviewed the following:      RISK FACTORS: Review      SMOKING CESSATION: Review      HOME EXERCISE ACTIVITY: Review      OUTPATIENT CARDIAC REHAB: Referred to Cardiac Rehabilitation Phase 2.

## 2022-03-30 NOTE — BH RN DISCHARGE NOTE
Patient is discharge home is stable condition all discharge and follow up appointment discoursed and given. IV removed, Patient verbalized understanding of information given.

## 2022-03-30 NOTE — PLAN OF CARE
Problem: Patient Centered Care  Goal: Patient preferences are identified and integrated in the patient's plan of care  Description: Interventions:  - What would you like us to know as we care for you?  I wear a CPAP at night  - Provide timely, complete, and accurate information to patient/family  - Incorporate patient and family knowledge, values, beliefs, and cultural backgrounds into the planning and delivery of care  - Encourage patient/family to participate in care and decision-making at the level they choose  - Honor patient and family perspectives and choices  Outcome: Progressing     Problem: RESPIRATORY - ADULT  Goal: Achieves optimal ventilation and oxygenation  Description: INTERVENTIONS:  - Assess for changes in respiratory status  - Assess for changes in mentation and behavior  - Position to facilitate oxygenation and minimize respiratory effort  - Oxygen supplementation based on oxygen saturation or ABGs  - Provide Smoking Cessation handout, if applicable  - Encourage broncho-pulmonary hygiene including cough, deep breathe, Incentive Spirometry  - Assess the need for suctioning and perform as needed  - Assess and instruct to report SOB or any respiratory difficulty  - Respiratory Therapy support as indicated  - Manage/alleviate anxiety  - Monitor for signs/symptoms of CO2 retention  Outcome: Progressing     Problem: METABOLIC/FLUID AND ELECTROLYTES - ADULT  Goal: Electrolytes maintained within normal limits  Description: INTERVENTIONS:  - Monitor labs and rhythm and assess patient for signs and symptoms of electrolyte imbalances  - Administer electrolyte replacement as ordered  - Monitor response to electrolyte replacements, including rhythm and repeat lab results as appropriate  - Fluid restriction as ordered  - Instruct patient on fluid and nutrition restrictions as appropriate  Outcome: Progressing  Goal: Hemodynamic stability and optimal renal function maintained  Description: INTERVENTIONS:  - Monitor labs and assess for signs and symptoms of volume excess or deficit  - Monitor intake, output and patient weight  - Monitor urine specific gravity, serum osmolarity and serum sodium as indicated or ordered  - Monitor response to interventions for patient's volume status, including labs, urine output, blood pressure (other measures as available)  - Encourage oral intake as appropriate  - Instruct patient on fluid and nutrition restrictions as appropriate  Outcome: Progressing     Problem: SKIN/TISSUE INTEGRITY - ADULT  Goal: Incision(s), wounds(s) or drain site(s) healing without S/S of infection  Description: INTERVENTIONS:  - Assess and document risk factors for pressure ulcer development  - Assess and document skin integrity  - Assess and document dressing/incision, wound bed, drain sites and surrounding tissue  - Implement wound care per orders  - Initiate isolation precautions as appropriate  - Initiate Pressure Ulcer prevention bundle as indicated  Outcome: Progressing

## 2022-04-29 ENCOUNTER — ORDER TRANSCRIPTION (OUTPATIENT)
Dept: CARDIAC REHAB | Facility: HOSPITAL | Age: 62
End: 2022-04-29

## 2022-05-02 ENCOUNTER — CARDPULM VISIT (OUTPATIENT)
Dept: CARDIAC REHAB | Facility: HOSPITAL | Age: 62
End: 2022-05-02
Attending: INTERNAL MEDICINE
Payer: COMMERCIAL

## 2022-05-02 DIAGNOSIS — I25.10 CAD (CORONARY ARTERY DISEASE): ICD-10-CM

## 2022-05-02 DIAGNOSIS — Z98.61 S/P PTCA (PERCUTANEOUS TRANSLUMINAL CORONARY ANGIOPLASTY): ICD-10-CM

## 2022-05-09 ENCOUNTER — CARDPULM VISIT (OUTPATIENT)
Dept: CARDIAC REHAB | Facility: HOSPITAL | Age: 62
End: 2022-05-09
Attending: INTERNAL MEDICINE
Payer: COMMERCIAL

## 2022-05-11 ENCOUNTER — HOSPITAL ENCOUNTER (OUTPATIENT)
Dept: ULTRASOUND IMAGING | Facility: HOSPITAL | Age: 62
Discharge: HOME | End: 2022-05-11
Attending: INTERNAL MEDICINE
Payer: COMMERCIAL

## 2022-05-11 ENCOUNTER — CARDPULM VISIT (OUTPATIENT)
Dept: CARDIAC REHAB | Facility: HOSPITAL | Age: 62
End: 2022-05-11
Attending: INTERNAL MEDICINE
Payer: COMMERCIAL

## 2022-05-11 DIAGNOSIS — I25.9 CHRONIC ISCHEMIC HEART DISEASE: ICD-10-CM

## 2022-05-11 PROCEDURE — 93880 EXTRACRANIAL BILAT STUDY: CPT | Performed by: INTERNAL MEDICINE

## 2022-05-12 ENCOUNTER — CARDPULM VISIT (OUTPATIENT)
Dept: CARDIAC REHAB | Facility: HOSPITAL | Age: 62
End: 2022-05-12
Attending: INTERNAL MEDICINE
Payer: COMMERCIAL

## 2022-05-16 ENCOUNTER — APPOINTMENT (OUTPATIENT)
Dept: CARDIAC REHAB | Facility: HOSPITAL | Age: 62
End: 2022-05-16
Attending: INTERNAL MEDICINE
Payer: COMMERCIAL

## 2022-05-16 PROCEDURE — 93798 PHYS/QHP OP CAR RHAB W/ECG: CPT

## 2022-05-18 ENCOUNTER — CARDPULM VISIT (OUTPATIENT)
Dept: CARDIAC REHAB | Facility: HOSPITAL | Age: 62
End: 2022-05-18
Attending: INTERNAL MEDICINE
Payer: COMMERCIAL

## 2022-05-18 PROCEDURE — 93798 PHYS/QHP OP CAR RHAB W/ECG: CPT

## 2022-05-19 ENCOUNTER — APPOINTMENT (OUTPATIENT)
Dept: CARDIAC REHAB | Facility: HOSPITAL | Age: 62
End: 2022-05-19
Attending: INTERNAL MEDICINE
Payer: COMMERCIAL

## 2022-05-19 PROCEDURE — 93798 PHYS/QHP OP CAR RHAB W/ECG: CPT

## 2022-05-23 ENCOUNTER — APPOINTMENT (OUTPATIENT)
Dept: CARDIAC REHAB | Facility: HOSPITAL | Age: 62
End: 2022-05-23
Attending: INTERNAL MEDICINE
Payer: COMMERCIAL

## 2022-05-25 ENCOUNTER — APPOINTMENT (OUTPATIENT)
Dept: CARDIAC REHAB | Facility: HOSPITAL | Age: 62
End: 2022-05-25
Attending: INTERNAL MEDICINE
Payer: COMMERCIAL

## 2022-05-26 ENCOUNTER — APPOINTMENT (OUTPATIENT)
Dept: CARDIAC REHAB | Facility: HOSPITAL | Age: 62
End: 2022-05-26
Attending: INTERNAL MEDICINE
Payer: COMMERCIAL

## 2022-05-26 PROCEDURE — 93798 PHYS/QHP OP CAR RHAB W/ECG: CPT

## 2022-05-30 ENCOUNTER — APPOINTMENT (OUTPATIENT)
Dept: CARDIAC REHAB | Facility: HOSPITAL | Age: 62
End: 2022-05-30
Attending: INTERNAL MEDICINE
Payer: COMMERCIAL

## 2022-06-01 ENCOUNTER — CARDPULM VISIT (OUTPATIENT)
Dept: CARDIAC REHAB | Facility: HOSPITAL | Age: 62
End: 2022-06-01
Attending: INTERNAL MEDICINE
Payer: COMMERCIAL

## 2022-06-01 PROCEDURE — 93798 PHYS/QHP OP CAR RHAB W/ECG: CPT

## 2022-06-02 ENCOUNTER — APPOINTMENT (OUTPATIENT)
Dept: CARDIAC REHAB | Facility: HOSPITAL | Age: 62
End: 2022-06-02
Attending: INTERNAL MEDICINE
Payer: COMMERCIAL

## 2022-06-06 ENCOUNTER — CARDPULM VISIT (OUTPATIENT)
Dept: CARDIAC REHAB | Facility: HOSPITAL | Age: 62
End: 2022-06-06
Attending: INTERNAL MEDICINE
Payer: COMMERCIAL

## 2022-06-06 PROCEDURE — 93798 PHYS/QHP OP CAR RHAB W/ECG: CPT

## 2022-06-08 ENCOUNTER — CARDPULM VISIT (OUTPATIENT)
Dept: CARDIAC REHAB | Facility: HOSPITAL | Age: 62
End: 2022-06-08
Attending: INTERNAL MEDICINE
Payer: COMMERCIAL

## 2022-06-08 PROCEDURE — 93798 PHYS/QHP OP CAR RHAB W/ECG: CPT

## 2022-06-09 ENCOUNTER — CARDPULM VISIT (OUTPATIENT)
Dept: CARDIAC REHAB | Facility: HOSPITAL | Age: 62
End: 2022-06-09
Attending: INTERNAL MEDICINE
Payer: COMMERCIAL

## 2022-06-09 PROCEDURE — 93798 PHYS/QHP OP CAR RHAB W/ECG: CPT

## 2022-06-13 ENCOUNTER — CARDPULM VISIT (OUTPATIENT)
Dept: CARDIAC REHAB | Facility: HOSPITAL | Age: 62
End: 2022-06-13
Attending: INTERNAL MEDICINE
Payer: COMMERCIAL

## 2022-06-13 PROCEDURE — 93798 PHYS/QHP OP CAR RHAB W/ECG: CPT

## 2022-06-15 ENCOUNTER — CARDPULM VISIT (OUTPATIENT)
Dept: CARDIAC REHAB | Facility: HOSPITAL | Age: 62
End: 2022-06-15
Attending: INTERNAL MEDICINE
Payer: COMMERCIAL

## 2022-06-15 PROCEDURE — 93798 PHYS/QHP OP CAR RHAB W/ECG: CPT

## 2022-06-20 ENCOUNTER — APPOINTMENT (OUTPATIENT)
Dept: CARDIAC REHAB | Facility: HOSPITAL | Age: 62
End: 2022-06-20
Attending: INTERNAL MEDICINE
Payer: COMMERCIAL

## 2022-06-22 ENCOUNTER — APPOINTMENT (OUTPATIENT)
Dept: CARDIAC REHAB | Facility: HOSPITAL | Age: 62
End: 2022-06-22
Attending: INTERNAL MEDICINE
Payer: COMMERCIAL

## 2022-06-23 ENCOUNTER — CARDPULM VISIT (OUTPATIENT)
Dept: CARDIAC REHAB | Facility: HOSPITAL | Age: 62
End: 2022-06-23
Attending: INTERNAL MEDICINE
Payer: COMMERCIAL

## 2022-06-23 PROCEDURE — 93798 PHYS/QHP OP CAR RHAB W/ECG: CPT

## 2022-06-27 ENCOUNTER — CARDPULM VISIT (OUTPATIENT)
Dept: CARDIAC REHAB | Facility: HOSPITAL | Age: 62
End: 2022-06-27
Attending: INTERNAL MEDICINE
Payer: COMMERCIAL

## 2022-06-27 PROCEDURE — 93798 PHYS/QHP OP CAR RHAB W/ECG: CPT

## 2022-06-29 ENCOUNTER — CARDPULM VISIT (OUTPATIENT)
Dept: CARDIAC REHAB | Facility: HOSPITAL | Age: 62
End: 2022-06-29
Attending: INTERNAL MEDICINE
Payer: COMMERCIAL

## 2022-06-29 PROCEDURE — 93798 PHYS/QHP OP CAR RHAB W/ECG: CPT

## 2022-06-30 ENCOUNTER — APPOINTMENT (OUTPATIENT)
Dept: CARDIAC REHAB | Facility: HOSPITAL | Age: 62
End: 2022-06-30
Attending: INTERNAL MEDICINE
Payer: COMMERCIAL

## 2022-07-04 ENCOUNTER — APPOINTMENT (OUTPATIENT)
Dept: CARDIAC REHAB | Facility: HOSPITAL | Age: 62
End: 2022-07-04
Attending: INTERNAL MEDICINE
Payer: COMMERCIAL

## 2022-07-11 ENCOUNTER — CARDPULM VISIT (OUTPATIENT)
Dept: CARDIAC REHAB | Facility: HOSPITAL | Age: 62
End: 2022-07-11
Attending: INTERNAL MEDICINE
Payer: COMMERCIAL

## 2022-07-11 ENCOUNTER — HOSPITAL ENCOUNTER (OUTPATIENT)
Dept: CT IMAGING | Facility: HOSPITAL | Age: 62
Discharge: HOME | End: 2022-07-11
Attending: RADIOLOGY
Payer: COMMERCIAL

## 2022-07-11 DIAGNOSIS — I71.4 AAA (ABDOMINAL AORTIC ANEURYSM) (HCC): ICD-10-CM

## 2022-07-11 LAB — CREAT BLD-MCNC: 0.6 MG/DL

## 2022-07-11 PROCEDURE — 93798 PHYS/QHP OP CAR RHAB W/ECG: CPT

## 2022-07-11 PROCEDURE — 74174 CTA ABD&PLVS W/CONTRAST: CPT | Performed by: RADIOLOGY

## 2022-07-11 PROCEDURE — 82565 ASSAY OF CREATININE: CPT

## 2022-07-13 ENCOUNTER — APPOINTMENT (OUTPATIENT)
Dept: CARDIAC REHAB | Facility: HOSPITAL | Age: 62
End: 2022-07-13
Attending: INTERNAL MEDICINE
Payer: COMMERCIAL

## 2022-07-14 ENCOUNTER — APPOINTMENT (OUTPATIENT)
Dept: CARDIAC REHAB | Facility: HOSPITAL | Age: 62
End: 2022-07-14
Attending: INTERNAL MEDICINE
Payer: COMMERCIAL

## 2022-08-15 ENCOUNTER — CARDPULM VISIT (OUTPATIENT)
Dept: CARDIAC REHAB | Facility: HOSPITAL | Age: 62
End: 2022-08-15
Attending: INTERNAL MEDICINE
Payer: COMMERCIAL

## 2022-08-15 PROCEDURE — 93798 PHYS/QHP OP CAR RHAB W/ECG: CPT

## 2022-08-17 ENCOUNTER — CARDPULM VISIT (OUTPATIENT)
Dept: CARDIAC REHAB | Facility: HOSPITAL | Age: 62
End: 2022-08-17
Attending: INTERNAL MEDICINE
Payer: COMMERCIAL

## 2022-08-17 PROCEDURE — 93798 PHYS/QHP OP CAR RHAB W/ECG: CPT

## 2022-08-18 ENCOUNTER — CARDPULM VISIT (OUTPATIENT)
Dept: CARDIAC REHAB | Facility: HOSPITAL | Age: 62
End: 2022-08-18
Attending: INTERNAL MEDICINE
Payer: COMMERCIAL

## 2022-08-18 PROCEDURE — 93798 PHYS/QHP OP CAR RHAB W/ECG: CPT

## 2022-08-22 ENCOUNTER — CARDPULM VISIT (OUTPATIENT)
Dept: CARDIAC REHAB | Facility: HOSPITAL | Age: 62
End: 2022-08-22
Attending: INTERNAL MEDICINE
Payer: COMMERCIAL

## 2022-08-22 PROCEDURE — 93798 PHYS/QHP OP CAR RHAB W/ECG: CPT

## 2022-08-29 ENCOUNTER — CARDPULM VISIT (OUTPATIENT)
Dept: CARDIAC REHAB | Facility: HOSPITAL | Age: 62
End: 2022-08-29
Attending: INTERNAL MEDICINE
Payer: COMMERCIAL

## 2022-08-29 PROCEDURE — 93798 PHYS/QHP OP CAR RHAB W/ECG: CPT

## 2022-09-01 ENCOUNTER — APPOINTMENT (OUTPATIENT)
Dept: CARDIAC REHAB | Facility: HOSPITAL | Age: 62
End: 2022-09-01
Attending: INTERNAL MEDICINE
Payer: COMMERCIAL

## 2022-09-07 ENCOUNTER — APPOINTMENT (OUTPATIENT)
Dept: CARDIAC REHAB | Facility: HOSPITAL | Age: 62
End: 2022-09-07
Attending: INTERNAL MEDICINE
Payer: COMMERCIAL

## 2022-09-08 ENCOUNTER — APPOINTMENT (OUTPATIENT)
Dept: CARDIAC REHAB | Facility: HOSPITAL | Age: 62
End: 2022-09-08
Attending: INTERNAL MEDICINE
Payer: COMMERCIAL

## 2022-09-12 ENCOUNTER — CARDPULM VISIT (OUTPATIENT)
Dept: CARDIAC REHAB | Facility: HOSPITAL | Age: 62
End: 2022-09-12
Attending: INTERNAL MEDICINE
Payer: COMMERCIAL

## 2022-09-12 PROCEDURE — 93798 PHYS/QHP OP CAR RHAB W/ECG: CPT

## 2022-09-14 ENCOUNTER — CARDPULM VISIT (OUTPATIENT)
Dept: CARDIAC REHAB | Facility: HOSPITAL | Age: 62
End: 2022-09-14
Attending: INTERNAL MEDICINE
Payer: COMMERCIAL

## 2022-09-14 PROCEDURE — 93798 PHYS/QHP OP CAR RHAB W/ECG: CPT

## 2022-09-15 ENCOUNTER — CARDPULM VISIT (OUTPATIENT)
Dept: CARDIAC REHAB | Facility: HOSPITAL | Age: 62
End: 2022-09-15
Attending: INTERNAL MEDICINE
Payer: COMMERCIAL

## 2022-09-15 PROCEDURE — 93798 PHYS/QHP OP CAR RHAB W/ECG: CPT

## 2022-09-19 ENCOUNTER — CARDPULM VISIT (OUTPATIENT)
Dept: CARDIAC REHAB | Facility: HOSPITAL | Age: 62
End: 2022-09-19
Attending: INTERNAL MEDICINE
Payer: COMMERCIAL

## 2022-09-19 PROCEDURE — 93798 PHYS/QHP OP CAR RHAB W/ECG: CPT

## 2022-09-21 ENCOUNTER — CARDPULM VISIT (OUTPATIENT)
Dept: CARDIAC REHAB | Facility: HOSPITAL | Age: 62
End: 2022-09-21
Attending: INTERNAL MEDICINE
Payer: COMMERCIAL

## 2022-09-21 PROCEDURE — 93798 PHYS/QHP OP CAR RHAB W/ECG: CPT

## 2022-09-22 ENCOUNTER — OFFICE VISIT (OUTPATIENT)
Dept: PULMONOLOGY | Facility: CLINIC | Age: 62
End: 2022-09-22

## 2022-09-22 VITALS
OXYGEN SATURATION: 99 % | WEIGHT: 281 LBS | HEART RATE: 63 BPM | HEIGHT: 74 IN | DIASTOLIC BLOOD PRESSURE: 88 MMHG | SYSTOLIC BLOOD PRESSURE: 132 MMHG | BODY MASS INDEX: 36.06 KG/M2

## 2022-09-22 DIAGNOSIS — R06.09 DYSPNEA ON EXERTION: Primary | ICD-10-CM

## 2022-09-22 PROCEDURE — 99213 OFFICE O/P EST LOW 20 MIN: CPT | Performed by: INTERNAL MEDICINE

## 2022-09-22 PROCEDURE — 3075F SYST BP GE 130 - 139MM HG: CPT | Performed by: INTERNAL MEDICINE

## 2022-09-22 PROCEDURE — 3079F DIAST BP 80-89 MM HG: CPT | Performed by: INTERNAL MEDICINE

## 2022-09-22 PROCEDURE — 3008F BODY MASS INDEX DOCD: CPT | Performed by: INTERNAL MEDICINE

## 2022-09-22 PROCEDURE — 94761 N-INVAS EAR/PLS OXIMETRY MLT: CPT | Performed by: INTERNAL MEDICINE

## 2022-09-26 ENCOUNTER — CARDPULM VISIT (OUTPATIENT)
Dept: CARDIAC REHAB | Facility: HOSPITAL | Age: 62
End: 2022-09-26
Attending: INTERNAL MEDICINE
Payer: COMMERCIAL

## 2022-09-26 PROCEDURE — 93798 PHYS/QHP OP CAR RHAB W/ECG: CPT

## 2022-09-28 ENCOUNTER — CARDPULM VISIT (OUTPATIENT)
Dept: CARDIAC REHAB | Facility: HOSPITAL | Age: 62
End: 2022-09-28
Attending: INTERNAL MEDICINE
Payer: COMMERCIAL

## 2022-09-28 PROCEDURE — 93798 PHYS/QHP OP CAR RHAB W/ECG: CPT

## 2022-09-29 ENCOUNTER — CARDPULM VISIT (OUTPATIENT)
Dept: CARDIAC REHAB | Facility: HOSPITAL | Age: 62
End: 2022-09-29
Attending: INTERNAL MEDICINE
Payer: COMMERCIAL

## 2022-09-29 PROCEDURE — 93798 PHYS/QHP OP CAR RHAB W/ECG: CPT

## 2022-10-03 ENCOUNTER — APPOINTMENT (OUTPATIENT)
Dept: CARDIAC REHAB | Facility: HOSPITAL | Age: 62
End: 2022-10-03
Attending: INTERNAL MEDICINE
Payer: COMMERCIAL

## 2022-10-05 ENCOUNTER — ORDER TRANSCRIPTION (OUTPATIENT)
Dept: ADMINISTRATIVE | Facility: HOSPITAL | Age: 62
End: 2022-10-05

## 2022-10-05 DIAGNOSIS — R06.09 DYSPNEA ON EXERTION: Primary | ICD-10-CM

## 2022-10-10 ENCOUNTER — LAB ENCOUNTER (OUTPATIENT)
Dept: LAB | Facility: HOSPITAL | Age: 62
End: 2022-10-10
Attending: INTERNAL MEDICINE
Payer: COMMERCIAL

## 2022-10-10 DIAGNOSIS — R06.09 DYSPNEA ON EXERTION: ICD-10-CM

## 2022-10-11 LAB — SARS-COV-2 RNA RESP QL NAA+PROBE: NOT DETECTED

## 2022-10-13 ENCOUNTER — HOSPITAL ENCOUNTER (OUTPATIENT)
Dept: RESPIRATORY THERAPY | Facility: HOSPITAL | Age: 62
Discharge: HOME | End: 2022-10-13
Attending: INTERNAL MEDICINE
Payer: COMMERCIAL

## 2022-10-13 DIAGNOSIS — R06.09 DYSPNEA ON EXERTION: ICD-10-CM

## 2022-10-13 PROCEDURE — 94729 DIFFUSING CAPACITY: CPT

## 2022-10-13 PROCEDURE — 94060 EVALUATION OF WHEEZING: CPT

## 2022-10-13 PROCEDURE — 94726 PLETHYSMOGRAPHY LUNG VOLUMES: CPT

## 2022-11-05 ENCOUNTER — HOSPITAL ENCOUNTER (EMERGENCY)
Facility: HOSPITAL | Age: 62
Discharge: HOME | End: 2022-11-05
Attending: EMERGENCY MEDICINE
Payer: COMMERCIAL

## 2022-11-05 VITALS
TEMPERATURE: 98 F | RESPIRATION RATE: 16 BRPM | BODY MASS INDEX: 36 KG/M2 | DIASTOLIC BLOOD PRESSURE: 98 MMHG | HEART RATE: 66 BPM | SYSTOLIC BLOOD PRESSURE: 168 MMHG | WEIGHT: 284 LBS | OXYGEN SATURATION: 93 %
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
## 2022-11-05 DIAGNOSIS — S61.452A DOG BITE, HAND, LEFT, INITIAL ENCOUNTER: Primary | ICD-10-CM

## 2022-11-05 PROCEDURE — 99283 EMERGENCY DEPT VISIT LOW MDM: CPT

## 2022-11-05 PROCEDURE — 90471 IMMUNIZATION ADMIN: CPT

## 2022-11-05 RX ORDER — AMOXICILLIN AND CLAVULANATE POTASSIUM 875; 125 MG/1; MG/1
1 TABLET, FILM COATED ORAL 2 TIMES DAILY
Qty: 20 TABLET | Refills: 0 | Status: SHIPPED | OUTPATIENT
Start: 2022-11-05 | End: 2022-11-15

## 2022-11-05 RX ORDER — AMOXICILLIN AND CLAVULANATE POTASSIUM 875; 125 MG/1; MG/1
875 TABLET, FILM COATED ORAL ONCE
Status: COMPLETED | OUTPATIENT
Start: 2022-11-05 | End: 2022-11-05

## 2022-11-06 NOTE — ED INITIAL ASSESSMENT (HPI)
Patient presents to the ED after his dog bit him on his left hand. Pt is on brilinta. Pt reports his dog is up to date on rabies vaccination. Pt has various lacerations to left hand, dressing placed.

## 2022-11-06 NOTE — ED QUICK NOTES
Pt provided discharge instructions and verbalized understanding. All questions and concerns addressed prior to patient leaving. Pt Ambulated out of ED.

## 2023-07-07 ENCOUNTER — HOSPITAL ENCOUNTER (OUTPATIENT)
Dept: CT IMAGING | Facility: HOSPITAL | Age: 63
Discharge: HOME | End: 2023-07-07
Attending: RADIOLOGY
Payer: COMMERCIAL

## 2023-07-07 DIAGNOSIS — I71.40 AAA (ABDOMINAL AORTIC ANEURYSM) (HCC): ICD-10-CM

## 2023-07-07 LAB
CREAT BLD-MCNC: 0.7 MG/DL
GFR SERPLBLD BASED ON 1.73 SQ M-ARVRAT: 103 ML/MIN/1.73M2 (ref 60–?)

## 2023-07-07 PROCEDURE — 74174 CTA ABD&PLVS W/CONTRAST: CPT | Performed by: RADIOLOGY

## 2023-07-07 PROCEDURE — 82565 ASSAY OF CREATININE: CPT

## 2023-08-17 RX ORDER — LOSARTAN POTASSIUM 50 MG/1
50 TABLET ORAL DAILY
COMMUNITY

## 2023-08-17 RX ORDER — CLOPIDOGREL BISULFATE 75 MG/1
75 TABLET ORAL DAILY
COMMUNITY

## 2023-08-19 ENCOUNTER — NURSE ONLY (OUTPATIENT)
Dept: LAB | Facility: HOSPITAL | Age: 63
End: 2023-08-19
Attending: RADIOLOGY
Payer: COMMERCIAL

## 2023-08-19 ENCOUNTER — HOSPITAL ENCOUNTER (OUTPATIENT)
Dept: GENERAL RADIOLOGY | Facility: HOSPITAL | Age: 63
Discharge: HOME | End: 2023-08-19
Attending: RADIOLOGY
Payer: COMMERCIAL

## 2023-08-19 ENCOUNTER — LAB ENCOUNTER (OUTPATIENT)
Dept: LAB | Facility: HOSPITAL | Age: 63
End: 2023-08-19
Attending: RADIOLOGY
Payer: COMMERCIAL

## 2023-08-19 DIAGNOSIS — Z01.818 PRE-OP TESTING: ICD-10-CM

## 2023-08-19 DIAGNOSIS — I71.40 AAA (ABDOMINAL AORTIC ANEURYSM) (HCC): ICD-10-CM

## 2023-08-19 LAB
ALBUMIN SERPL-MCNC: 3.9 G/DL (ref 3.4–5)
ALBUMIN/GLOB SERPL: 1.2 {RATIO} (ref 1–2)
ALP LIVER SERPL-CCNC: 59 U/L
ALT SERPL-CCNC: 41 U/L
ANION GAP SERPL CALC-SCNC: 3 MMOL/L (ref 0–18)
ANTIBODY SCREEN: NEGATIVE
AST SERPL-CCNC: 24 U/L (ref 15–37)
ATRIAL RATE: 59 BPM
BASOPHILS # BLD AUTO: 0.03 X10(3) UL (ref 0–0.2)
BASOPHILS NFR BLD AUTO: 0.5 %
BILIRUB SERPL-MCNC: 0.2 MG/DL (ref 0.1–2)
BILIRUB UR QL: NEGATIVE
BUN BLD-MCNC: 13 MG/DL (ref 7–18)
BUN/CREAT SERPL: 16.5 (ref 10–20)
CALCIUM BLD-MCNC: 10.2 MG/DL (ref 8.5–10.1)
CHLORIDE SERPL-SCNC: 107 MMOL/L (ref 98–112)
CLARITY UR: CLEAR
CO2 SERPL-SCNC: 29 MMOL/L (ref 21–32)
COLOR UR: YELLOW
CREAT BLD-MCNC: 0.79 MG/DL
DEPRECATED RDW RBC AUTO: 49.3 FL (ref 35.1–46.3)
EGFRCR SERPLBLD CKD-EPI 2021: 99 ML/MIN/1.73M2 (ref 60–?)
EOSINOPHIL # BLD AUTO: 0.28 X10(3) UL (ref 0–0.7)
EOSINOPHIL NFR BLD AUTO: 4.2 %
ERYTHROCYTE [DISTWIDTH] IN BLOOD BY AUTOMATED COUNT: 15.1 % (ref 11–15)
FASTING STATUS PATIENT QL REPORTED: YES
GLOBULIN PLAS-MCNC: 3.3 G/DL (ref 2.8–4.4)
GLUCOSE BLD-MCNC: 141 MG/DL (ref 70–99)
GLUCOSE UR-MCNC: NORMAL MG/DL
HCT VFR BLD AUTO: 38.1 %
HGB BLD-MCNC: 12 G/DL
HGB UR QL STRIP.AUTO: NEGATIVE
HYALINE CASTS #/AREA URNS AUTO: PRESENT /LPF
IMM GRANULOCYTES # BLD AUTO: 0.03 X10(3) UL (ref 0–1)
IMM GRANULOCYTES NFR BLD: 0.5 %
INR BLD: 1.01 (ref 0.85–1.16)
KETONES UR-MCNC: NEGATIVE MG/DL
LEUKOCYTE ESTERASE UR QL STRIP.AUTO: NEGATIVE
LYMPHOCYTES # BLD AUTO: 1.3 X10(3) UL (ref 1–4)
LYMPHOCYTES NFR BLD AUTO: 19.5 %
MCH RBC QN AUTO: 28.4 PG (ref 26–34)
MCHC RBC AUTO-ENTMCNC: 31.5 G/DL (ref 31–37)
MCV RBC AUTO: 90.1 FL
MONOCYTES # BLD AUTO: 0.62 X10(3) UL (ref 0.1–1)
MONOCYTES NFR BLD AUTO: 9.3 %
MRSA DNA SPEC QL NAA+PROBE: NEGATIVE
NEUTROPHILS # BLD AUTO: 4.39 X10 (3) UL (ref 1.5–7.7)
NEUTROPHILS # BLD AUTO: 4.39 X10(3) UL (ref 1.5–7.7)
NEUTROPHILS NFR BLD AUTO: 66 %
NITRITE UR QL STRIP.AUTO: NEGATIVE
OSMOLALITY SERPL CALC.SUM OF ELEC: 290 MOSM/KG (ref 275–295)
P AXIS: -15 DEGREES
P-R INTERVAL: 190 MS
PH UR: 6 [PH] (ref 5–8)
PLATELET # BLD AUTO: 217 10(3)UL (ref 150–450)
POTASSIUM SERPL-SCNC: 3.9 MMOL/L (ref 3.5–5.1)
PROT SERPL-MCNC: 7.2 G/DL (ref 6.4–8.2)
PROT UR-MCNC: 30 MG/DL
PROTHROMBIN TIME: 13.2 SECONDS (ref 11.6–14.8)
Q-T INTERVAL: 444 MS
QRS DURATION: 88 MS
QTC CALCULATION (BEZET): 439 MS
R AXIS: 61 DEGREES
RBC # BLD AUTO: 4.23 X10(6)UL
RH BLOOD TYPE: POSITIVE
SARS-COV-2 RNA RESP QL NAA+PROBE: NOT DETECTED
SODIUM SERPL-SCNC: 139 MMOL/L (ref 136–145)
SP GR UR STRIP: 1.03 (ref 1–1.03)
T AXIS: 73 DEGREES
UROBILINOGEN UR STRIP-ACNC: 2
VENTRICULAR RATE: 59 BPM
WBC # BLD AUTO: 6.7 X10(3) UL (ref 4–11)

## 2023-08-19 PROCEDURE — 85610 PROTHROMBIN TIME: CPT

## 2023-08-19 PROCEDURE — 36415 COLL VENOUS BLD VENIPUNCTURE: CPT

## 2023-08-19 PROCEDURE — 71046 X-RAY EXAM CHEST 2 VIEWS: CPT | Performed by: RADIOLOGY

## 2023-08-19 PROCEDURE — 86900 BLOOD TYPING SEROLOGIC ABO: CPT

## 2023-08-19 PROCEDURE — 86901 BLOOD TYPING SEROLOGIC RH(D): CPT

## 2023-08-19 PROCEDURE — 81001 URINALYSIS AUTO W/SCOPE: CPT

## 2023-08-19 PROCEDURE — 93010 ELECTROCARDIOGRAM REPORT: CPT | Performed by: INTERNAL MEDICINE

## 2023-08-19 PROCEDURE — 87635 SARS-COV-2 COVID-19 AMP PRB: CPT

## 2023-08-19 PROCEDURE — 87641 MR-STAPH DNA AMP PROBE: CPT

## 2023-08-19 PROCEDURE — 86920 COMPATIBILITY TEST SPIN: CPT

## 2023-08-19 PROCEDURE — 86850 RBC ANTIBODY SCREEN: CPT

## 2023-08-19 PROCEDURE — 85025 COMPLETE CBC W/AUTO DIFF WBC: CPT

## 2023-08-19 PROCEDURE — 93005 ELECTROCARDIOGRAM TRACING: CPT

## 2023-08-19 PROCEDURE — 80053 COMPREHEN METABOLIC PANEL: CPT

## 2023-08-21 NOTE — H&P
[facility]    PATIENT'S NAME: Jhony YANG   ATTENDING PHYSICIAN: Mahendra Nobles. Ernesto Burnette MD   PATIENT ACCOUNT#:   [id number]    LOCATION:  [address] RECORD #:   [id number]       YOB: 1960  ADMISSION DATE:       08/19/2023    HISTORY AND PHYSICAL EXAMINATION    HISTORY OF PRESENT ILLNESS:  This is a 68-year-old white male consulted by Dr. Ernesto Burnette for treatment for abdominal aortic aneurysm. The patient had aneurysm in the past that was 4.3 to 4.4 cm. It has increased in size. By my measurements, it is up to 5.2 to 5.3 cm. He had been seen and evaluated by Dr. Ernesto Burnette and was placed on a schedule with consultation with [employer] for endurance stent graft. The patient's examination will be done the date of surgery. The patient will be followed by Dr. Hermelindo Harley medically and Dr. Corry Mckinnon. PAST MEDICAL HISTORY:  He has history of hypertension, dyslipidemia. He has had diagnosis of sleep apnea, as well as arthritis. PAST SURGICAL HISTORY:  Carpal tunnel release, left thigh surgery, lung surgery after an accident, bilateral shoulder arthroscopies, tonsillectomy. MEDICATIONS:  Aspirin, ticagrelor 90 mg b.i.d., Crestor, fluticasone, allopurinol, levothyroxine, lisinopril 10 mg a day, omeprazole. ALLERGIES:  Reportedly lactose. SOCIAL HISTORY:  He is single. He supposedly smokes cigars and pipes. He has 1 to 2 glasses of wine a day. FAMILY HISTORY:  Breast cancer in the mother, kidney pathology in the father, arthritis in the sister. PHYSICAL EXAMINATION:    VITAL SIGNS:  His last vital signs were 196/102 a couple of months ago. Afebrile, and his pulse was 72. His CT angiogram was reviewed and he has a 5.2 to 5.3 cm infrarenal aortic aneurysm and about 2 cm to 2.2 cm left common iliac artery aneurysm. He has widely paten iliofemoral arterial segments. The case will be reviewed and discussed with Dr. Ernesto Burnette.     IMPRESSION:  The patient with enlarging aortic aneurysm. We will evaluate physically tomorrow morning, but schedule for a stent graft repair of aortic aneurysm per Dr. Rima Hutson, and we will discuss the risks and complications with the patient tomorrow prior to procedure. Dictated By Vasyl Jimenez MD  d: 08/21/2023 09:57:27  t: 08/21/2023 10:11:43  Job [id number]  JJW/    cc: Yvette Leigh.  Rima Hutson MD

## 2023-08-22 ENCOUNTER — HOSPITAL ENCOUNTER (INPATIENT)
Dept: INTERVENTIONAL RADIOLOGY/VASCULAR | Facility: HOSPITAL | Age: 63
LOS: 1 days | Discharge: HOME HEALTH | End: 2023-08-23
Attending: RADIOLOGY | Admitting: RADIOLOGY
Payer: COMMERCIAL

## 2023-08-22 ENCOUNTER — ANESTHESIA EVENT (OUTPATIENT)
Dept: INTERVENTIONAL RADIOLOGY/VASCULAR | Facility: HOSPITAL | Age: 63
End: 2023-08-22
Payer: COMMERCIAL

## 2023-08-22 DIAGNOSIS — Z01.818 PRE-OP TESTING: Primary | ICD-10-CM

## 2023-08-22 DIAGNOSIS — I71.40 AAA (ABDOMINAL AORTIC ANEURYSM) (HCC): ICD-10-CM

## 2023-08-22 PROBLEM — E78.5 HYPERLIPIDEMIA: Status: ACTIVE | Noted: 2023-08-22

## 2023-08-22 PROBLEM — I10 ESSENTIAL HYPERTENSION: Status: ACTIVE | Noted: 2023-08-22

## 2023-08-22 PROBLEM — G47.33 OSA (OBSTRUCTIVE SLEEP APNEA): Status: ACTIVE | Noted: 2023-08-22

## 2023-08-22 LAB
ANION GAP SERPL CALC-SCNC: 5 MMOL/L (ref 0–18)
APTT PPP: 33.9 SECONDS (ref 23.3–35.6)
BUN BLD-MCNC: 8 MG/DL (ref 7–18)
BUN/CREAT SERPL: 13.6 (ref 10–20)
CALCIUM BLD-MCNC: 9.2 MG/DL (ref 8.5–10.1)
CHLORIDE SERPL-SCNC: 108 MMOL/L (ref 98–112)
CO2 SERPL-SCNC: 26 MMOL/L (ref 21–32)
CREAT BLD-MCNC: 0.59 MG/DL
EGFRCR SERPLBLD CKD-EPI 2021: 108 ML/MIN/1.73M2 (ref 60–?)
EST. AVERAGE GLUCOSE BLD GHB EST-MCNC: 140 MG/DL (ref 68–126)
GLUCOSE BLD-MCNC: 154 MG/DL (ref 70–99)
GLUCOSE BLDC GLUCOMTR-MCNC: 138 MG/DL (ref 70–99)
GLUCOSE BLDC GLUCOMTR-MCNC: 150 MG/DL (ref 70–99)
GLUCOSE BLDC GLUCOMTR-MCNC: 172 MG/DL (ref 70–99)
HBA1C MFR BLD: 6.5 % (ref ?–5.7)
ISTAT ACTIVATED CLOTTING TIME: 167 SECONDS (ref 125–137)
ISTAT ACTIVATED CLOTTING TIME: 269 SECONDS (ref 125–137)
ISTAT ACTIVATED CLOTTING TIME: 275 SECONDS (ref 125–137)
OSMOLALITY SERPL CALC.SUM OF ELEC: 289 MOSM/KG (ref 275–295)
POTASSIUM SERPL-SCNC: 3.9 MMOL/L (ref 3.5–5.1)
SODIUM SERPL-SCNC: 139 MMOL/L (ref 136–145)

## 2023-08-22 PROCEDURE — B24BYZZ ULTRASONOGRAPHY OF HEART WITH AORTA USING OTHER CONTRAST: ICD-10-PCS | Performed by: RADIOLOGY

## 2023-08-22 PROCEDURE — B4101ZZ FLUOROSCOPY OF ABDOMINAL AORTA USING LOW OSMOLAR CONTRAST: ICD-10-PCS | Performed by: RADIOLOGY

## 2023-08-22 PROCEDURE — 99233 SBSQ HOSP IP/OBS HIGH 50: CPT | Performed by: HOSPITALIST

## 2023-08-22 PROCEDURE — 04V03DZ RESTRICTION OF ABDOMINAL AORTA WITH INTRALUMINAL DEVICE, PERCUTANEOUS APPROACH: ICD-10-PCS | Performed by: RADIOLOGY

## 2023-08-22 RX ORDER — CEFAZOLIN SODIUM/WATER 2 G/20 ML
SYRINGE (ML) INTRAVENOUS
Status: COMPLETED
Start: 2023-08-22 | End: 2023-08-22

## 2023-08-22 RX ORDER — DEXTROSE MONOHYDRATE 25 G/50ML
50 INJECTION, SOLUTION INTRAVENOUS
Status: DISCONTINUED | OUTPATIENT
Start: 2023-08-22 | End: 2023-08-23

## 2023-08-22 RX ORDER — SODIUM CHLORIDE 9 MG/ML
INJECTION, SOLUTION INTRAVENOUS CONTINUOUS
Status: DISCONTINUED | OUTPATIENT
Start: 2023-08-22 | End: 2023-08-23

## 2023-08-22 RX ORDER — HYDROCODONE BITARTRATE AND ACETAMINOPHEN 5; 325 MG/1; MG/1
1 TABLET ORAL ONCE AS NEEDED
Status: DISCONTINUED | OUTPATIENT
Start: 2023-08-22 | End: 2023-08-22 | Stop reason: HOSPADM

## 2023-08-22 RX ORDER — ENEMA 19; 7 G/133ML; G/133ML
1 ENEMA RECTAL ONCE AS NEEDED
Status: DISCONTINUED | OUTPATIENT
Start: 2023-08-22 | End: 2023-08-23

## 2023-08-22 RX ORDER — HYDRALAZINE HYDROCHLORIDE 20 MG/ML
10 INJECTION INTRAMUSCULAR; INTRAVENOUS EVERY 4 HOURS PRN
Status: DISCONTINUED | OUTPATIENT
Start: 2023-08-22 | End: 2023-08-23

## 2023-08-22 RX ORDER — CLOPIDOGREL BISULFATE 75 MG/1
75 TABLET ORAL DAILY
Status: DISCONTINUED | OUTPATIENT
Start: 2023-08-22 | End: 2023-08-23

## 2023-08-22 RX ORDER — ALLOPURINOL 300 MG/1
300 TABLET ORAL DAILY
COMMUNITY
Start: 2023-08-06

## 2023-08-22 RX ORDER — HYDROCODONE BITARTRATE AND ACETAMINOPHEN 5; 325 MG/1; MG/1
2 TABLET ORAL ONCE AS NEEDED
Status: DISCONTINUED | OUTPATIENT
Start: 2023-08-22 | End: 2023-08-22 | Stop reason: HOSPADM

## 2023-08-22 RX ORDER — CLOPIDOGREL BISULFATE 75 MG/1
75 TABLET ORAL DAILY
Status: DISCONTINUED | OUTPATIENT
Start: 2023-08-23 | End: 2023-08-23

## 2023-08-22 RX ORDER — NALOXONE HYDROCHLORIDE 0.4 MG/ML
80 INJECTION, SOLUTION INTRAMUSCULAR; INTRAVENOUS; SUBCUTANEOUS AS NEEDED
Status: DISCONTINUED | OUTPATIENT
Start: 2023-08-22 | End: 2023-08-22 | Stop reason: HOSPADM

## 2023-08-22 RX ORDER — PROCHLORPERAZINE EDISYLATE 5 MG/ML
5 INJECTION INTRAMUSCULAR; INTRAVENOUS EVERY 8 HOURS PRN
Status: DISCONTINUED | OUTPATIENT
Start: 2023-08-22 | End: 2023-08-23

## 2023-08-22 RX ORDER — ALLOPURINOL 300 MG/1
300 TABLET ORAL DAILY
Status: DISCONTINUED | OUTPATIENT
Start: 2023-08-22 | End: 2023-08-23

## 2023-08-22 RX ORDER — HEPARIN SODIUM 1000 [USP'U]/ML
INJECTION, SOLUTION INTRAVENOUS; SUBCUTANEOUS AS NEEDED
Status: DISCONTINUED | OUTPATIENT
Start: 2023-08-22 | End: 2023-08-22 | Stop reason: SURG

## 2023-08-22 RX ORDER — POLYETHYLENE GLYCOL 3350 17 G/17G
17 POWDER, FOR SOLUTION ORAL DAILY PRN
Status: DISCONTINUED | OUTPATIENT
Start: 2023-08-22 | End: 2023-08-23

## 2023-08-22 RX ORDER — BISACODYL 10 MG
10 SUPPOSITORY, RECTAL RECTAL
Status: DISCONTINUED | OUTPATIENT
Start: 2023-08-22 | End: 2023-08-23

## 2023-08-22 RX ORDER — ACETAMINOPHEN 500 MG
1000 TABLET ORAL ONCE
Status: COMPLETED | OUTPATIENT
Start: 2023-08-22 | End: 2023-08-22

## 2023-08-22 RX ORDER — PROTAMINE SULFATE 10 MG/ML
INJECTION, SOLUTION INTRAVENOUS
Status: COMPLETED
Start: 2023-08-22 | End: 2023-08-22

## 2023-08-22 RX ORDER — LIDOCAINE HYDROCHLORIDE 10 MG/ML
INJECTION, SOLUTION EPIDURAL; INFILTRATION; INTRACAUDAL; PERINEURAL AS NEEDED
Status: DISCONTINUED | OUTPATIENT
Start: 2023-08-22 | End: 2023-08-22 | Stop reason: SURG

## 2023-08-22 RX ORDER — NITROGLYCERIN 20 MG/100ML
INJECTION INTRAVENOUS CONTINUOUS PRN
Status: DISCONTINUED | OUTPATIENT
Start: 2023-08-22 | End: 2023-08-23

## 2023-08-22 RX ORDER — BUPROPION HYDROCHLORIDE 300 MG/1
300 TABLET ORAL DAILY
Status: DISCONTINUED | OUTPATIENT
Start: 2023-08-23 | End: 2023-08-23

## 2023-08-22 RX ORDER — CEFAZOLIN SODIUM IN 0.9 % NACL 3 G/100 ML
3 INTRAVENOUS SOLUTION, PIGGYBACK (ML) INTRAVENOUS EVERY 8 HOURS
Status: COMPLETED | OUTPATIENT
Start: 2023-08-22 | End: 2023-08-23

## 2023-08-22 RX ORDER — HYDROCODONE BITARTRATE AND ACETAMINOPHEN 5; 325 MG/1; MG/1
2 TABLET ORAL EVERY 4 HOURS PRN
Status: DISCONTINUED | OUTPATIENT
Start: 2023-08-22 | End: 2023-08-23

## 2023-08-22 RX ORDER — HYDROMORPHONE HYDROCHLORIDE 1 MG/ML
0.6 INJECTION, SOLUTION INTRAMUSCULAR; INTRAVENOUS; SUBCUTANEOUS EVERY 5 MIN PRN
Status: DISCONTINUED | OUTPATIENT
Start: 2023-08-22 | End: 2023-08-22 | Stop reason: HOSPADM

## 2023-08-22 RX ORDER — DEXAMETHASONE SODIUM PHOSPHATE 4 MG/ML
VIAL (ML) INJECTION AS NEEDED
Status: DISCONTINUED | OUTPATIENT
Start: 2023-08-22 | End: 2023-08-22 | Stop reason: SURG

## 2023-08-22 RX ORDER — ACETAMINOPHEN 500 MG
1000 TABLET ORAL ONCE AS NEEDED
Status: DISCONTINUED | OUTPATIENT
Start: 2023-08-22 | End: 2023-08-22 | Stop reason: HOSPADM

## 2023-08-22 RX ORDER — PROCHLORPERAZINE EDISYLATE 5 MG/ML
5 INJECTION INTRAMUSCULAR; INTRAVENOUS EVERY 8 HOURS PRN
Status: DISCONTINUED | OUTPATIENT
Start: 2023-08-22 | End: 2023-08-22 | Stop reason: HOSPADM

## 2023-08-22 RX ORDER — HYDROMORPHONE HYDROCHLORIDE 1 MG/ML
0.4 INJECTION, SOLUTION INTRAMUSCULAR; INTRAVENOUS; SUBCUTANEOUS EVERY 5 MIN PRN
Status: DISCONTINUED | OUTPATIENT
Start: 2023-08-22 | End: 2023-08-22 | Stop reason: HOSPADM

## 2023-08-22 RX ORDER — LOSARTAN POTASSIUM 50 MG/1
50 TABLET ORAL DAILY
Status: DISCONTINUED | OUTPATIENT
Start: 2023-08-22 | End: 2023-08-23

## 2023-08-22 RX ORDER — NICOTINE POLACRILEX 4 MG
30 LOZENGE BUCCAL
Status: DISCONTINUED | OUTPATIENT
Start: 2023-08-22 | End: 2023-08-23

## 2023-08-22 RX ORDER — PROTAMINE SULFATE 10 MG/ML
INJECTION, SOLUTION INTRAVENOUS AS NEEDED
Status: DISCONTINUED | OUTPATIENT
Start: 2023-08-22 | End: 2023-08-22 | Stop reason: SURG

## 2023-08-22 RX ORDER — AMLODIPINE BESYLATE 5 MG/1
5 TABLET ORAL DAILY
Status: DISCONTINUED | OUTPATIENT
Start: 2023-08-22 | End: 2023-08-23

## 2023-08-22 RX ORDER — ONDANSETRON 2 MG/ML
4 INJECTION INTRAMUSCULAR; INTRAVENOUS EVERY 6 HOURS PRN
Status: DISCONTINUED | OUTPATIENT
Start: 2023-08-22 | End: 2023-08-23

## 2023-08-22 RX ORDER — PANTOPRAZOLE SODIUM 40 MG/1
40 TABLET, DELAYED RELEASE ORAL
Status: DISCONTINUED | OUTPATIENT
Start: 2023-08-23 | End: 2023-08-23

## 2023-08-22 RX ORDER — DEXTROSE, SODIUM CHLORIDE, SODIUM LACTATE, POTASSIUM CHLORIDE, AND CALCIUM CHLORIDE 5; .6; .31; .03; .02 G/100ML; G/100ML; G/100ML; G/100ML; G/100ML
INJECTION, SOLUTION INTRAVENOUS CONTINUOUS
Status: DISCONTINUED | OUTPATIENT
Start: 2023-08-22 | End: 2023-08-22

## 2023-08-22 RX ORDER — HYDROCODONE BITARTRATE AND ACETAMINOPHEN 5; 325 MG/1; MG/1
1 TABLET ORAL EVERY 4 HOURS PRN
Status: DISCONTINUED | OUTPATIENT
Start: 2023-08-22 | End: 2023-08-23

## 2023-08-22 RX ORDER — SENNOSIDES 8.6 MG
17.2 TABLET ORAL NIGHTLY PRN
Status: DISCONTINUED | OUTPATIENT
Start: 2023-08-22 | End: 2023-08-23

## 2023-08-22 RX ORDER — HEPARIN SODIUM 1000 [USP'U]/ML
INJECTION, SOLUTION INTRAVENOUS; SUBCUTANEOUS
Status: COMPLETED
Start: 2023-08-22 | End: 2023-08-22

## 2023-08-22 RX ORDER — SODIUM CHLORIDE, SODIUM LACTATE, POTASSIUM CHLORIDE, CALCIUM CHLORIDE 600; 310; 30; 20 MG/100ML; MG/100ML; MG/100ML; MG/100ML
INJECTION, SOLUTION INTRAVENOUS CONTINUOUS
Status: DISCONTINUED | OUTPATIENT
Start: 2023-08-22 | End: 2023-08-22 | Stop reason: HOSPADM

## 2023-08-22 RX ORDER — MORPHINE SULFATE 10 MG/ML
6 INJECTION, SOLUTION INTRAMUSCULAR; INTRAVENOUS EVERY 10 MIN PRN
Status: DISCONTINUED | OUTPATIENT
Start: 2023-08-22 | End: 2023-08-22 | Stop reason: HOSPADM

## 2023-08-22 RX ORDER — CLOPIDOGREL BISULFATE 75 MG/1
300 TABLET ORAL ONCE
Status: COMPLETED | OUTPATIENT
Start: 2023-08-22 | End: 2023-08-22

## 2023-08-22 RX ORDER — ACETAMINOPHEN 500 MG
TABLET ORAL
Status: COMPLETED
Start: 2023-08-22 | End: 2023-08-22

## 2023-08-22 RX ORDER — MORPHINE SULFATE 4 MG/ML
4 INJECTION, SOLUTION INTRAMUSCULAR; INTRAVENOUS EVERY 10 MIN PRN
Status: DISCONTINUED | OUTPATIENT
Start: 2023-08-22 | End: 2023-08-22 | Stop reason: HOSPADM

## 2023-08-22 RX ORDER — LEVOTHYROXINE SODIUM 0.12 MG/1
125 TABLET ORAL
Status: DISCONTINUED | OUTPATIENT
Start: 2023-08-23 | End: 2023-08-23

## 2023-08-22 RX ORDER — ASPIRIN 81 MG/1
81 TABLET, CHEWABLE ORAL DAILY
Status: DISCONTINUED | OUTPATIENT
Start: 2023-08-22 | End: 2023-08-23

## 2023-08-22 RX ORDER — METOPROLOL TARTRATE 5 MG/5ML
2.5 INJECTION INTRAVENOUS
Status: DISCONTINUED | OUTPATIENT
Start: 2023-08-22 | End: 2023-08-23

## 2023-08-22 RX ORDER — NICOTINE POLACRILEX 4 MG
15 LOZENGE BUCCAL
Status: DISCONTINUED | OUTPATIENT
Start: 2023-08-22 | End: 2023-08-23

## 2023-08-22 RX ORDER — SODIUM CHLORIDE 9 MG/ML
INJECTION, SOLUTION INTRAVENOUS CONTINUOUS
Status: DISCONTINUED | OUTPATIENT
Start: 2023-08-22 | End: 2023-08-22

## 2023-08-22 RX ORDER — SODIUM CHLORIDE, SODIUM LACTATE, POTASSIUM CHLORIDE, CALCIUM CHLORIDE 600; 310; 30; 20 MG/100ML; MG/100ML; MG/100ML; MG/100ML
INJECTION, SOLUTION INTRAVENOUS CONTINUOUS
Status: DISCONTINUED | OUTPATIENT
Start: 2023-08-22 | End: 2023-08-22

## 2023-08-22 RX ORDER — LIDOCAINE HYDROCHLORIDE 40 MG/ML
SOLUTION TOPICAL AS NEEDED
Status: DISCONTINUED | OUTPATIENT
Start: 2023-08-22 | End: 2023-08-22 | Stop reason: SURG

## 2023-08-22 RX ORDER — ROSUVASTATIN CALCIUM 20 MG/1
40 TABLET, COATED ORAL NIGHTLY
Status: DISCONTINUED | OUTPATIENT
Start: 2023-08-22 | End: 2023-08-23

## 2023-08-22 RX ORDER — ROCURONIUM BROMIDE 10 MG/ML
INJECTION, SOLUTION INTRAVENOUS AS NEEDED
Status: DISCONTINUED | OUTPATIENT
Start: 2023-08-22 | End: 2023-08-22 | Stop reason: SURG

## 2023-08-22 RX ORDER — ACETAMINOPHEN 325 MG/1
650 TABLET ORAL EVERY 4 HOURS PRN
Status: DISCONTINUED | OUTPATIENT
Start: 2023-08-22 | End: 2023-08-23

## 2023-08-22 RX ORDER — CEFAZOLIN SODIUM IN 0.9 % NACL 3 G/100 ML
INTRAVENOUS SOLUTION, PIGGYBACK (ML) INTRAVENOUS AS NEEDED
Status: DISCONTINUED | OUTPATIENT
Start: 2023-08-22 | End: 2023-08-22 | Stop reason: SURG

## 2023-08-22 RX ORDER — ONDANSETRON 2 MG/ML
INJECTION INTRAMUSCULAR; INTRAVENOUS AS NEEDED
Status: DISCONTINUED | OUTPATIENT
Start: 2023-08-22 | End: 2023-08-22 | Stop reason: SURG

## 2023-08-22 RX ORDER — HYDROMORPHONE HYDROCHLORIDE 1 MG/ML
0.2 INJECTION, SOLUTION INTRAMUSCULAR; INTRAVENOUS; SUBCUTANEOUS EVERY 5 MIN PRN
Status: DISCONTINUED | OUTPATIENT
Start: 2023-08-22 | End: 2023-08-22 | Stop reason: HOSPADM

## 2023-08-22 RX ORDER — FLUTICASONE FUROATE AND VILANTEROL 100; 25 UG/1; UG/1
1 POWDER RESPIRATORY (INHALATION) DAILY
Status: DISCONTINUED | OUTPATIENT
Start: 2023-08-22 | End: 2023-08-23

## 2023-08-22 RX ORDER — ASPIRIN 81 MG/1
81 TABLET ORAL DAILY
Status: DISCONTINUED | OUTPATIENT
Start: 2023-08-23 | End: 2023-08-23

## 2023-08-22 RX ORDER — ONDANSETRON 2 MG/ML
4 INJECTION INTRAMUSCULAR; INTRAVENOUS EVERY 6 HOURS PRN
Status: DISCONTINUED | OUTPATIENT
Start: 2023-08-22 | End: 2023-08-22 | Stop reason: HOSPADM

## 2023-08-22 RX ORDER — SODIUM CHLORIDE 9 MG/ML
INJECTION, SOLUTION INTRAVENOUS CONTINUOUS PRN
Status: DISCONTINUED | OUTPATIENT
Start: 2023-08-22 | End: 2023-08-22 | Stop reason: SURG

## 2023-08-22 RX ORDER — MORPHINE SULFATE 4 MG/ML
2 INJECTION, SOLUTION INTRAMUSCULAR; INTRAVENOUS EVERY 10 MIN PRN
Status: DISCONTINUED | OUTPATIENT
Start: 2023-08-22 | End: 2023-08-22 | Stop reason: HOSPADM

## 2023-08-22 RX ORDER — LIDOCAINE HYDROCHLORIDE 20 MG/ML
INJECTION, SOLUTION INFILTRATION; PERINEURAL
Status: COMPLETED
Start: 2023-08-22 | End: 2023-08-22

## 2023-08-22 RX ADMIN — LIDOCAINE HYDROCHLORIDE 50 MG: 10 INJECTION, SOLUTION EPIDURAL; INFILTRATION; INTRACAUDAL; PERINEURAL at 10:38:00

## 2023-08-22 RX ADMIN — NITROGLYCERIN 50 MCG/MIN: 20 INJECTION INTRAVENOUS at 18:28:00

## 2023-08-22 RX ADMIN — ROCURONIUM BROMIDE 50 MG: 10 INJECTION, SOLUTION INTRAVENOUS at 10:38:00

## 2023-08-22 RX ADMIN — DEXAMETHASONE SODIUM PHOSPHATE 4 MG: 4 MG/ML VIAL (ML) INJECTION at 10:53:00

## 2023-08-22 RX ADMIN — NITROGLYCERIN 30 MCG/MIN: 20 INJECTION INTRAVENOUS at 15:23:00

## 2023-08-22 RX ADMIN — AMLODIPINE BESYLATE 5 MG: 5 TABLET ORAL at 15:42:00

## 2023-08-22 RX ADMIN — NITROGLYCERIN 90 MCG/MIN: 20 INJECTION INTRAVENOUS at 15:00:00

## 2023-08-22 RX ADMIN — SENNOSIDES 17.2 MG: 8.6 MG TABLET ORAL at 21:06:00

## 2023-08-22 RX ADMIN — CEFAZOLIN SODIUM IN 0.9 % NACL 3 G: 3 G/100 ML INTRAVENOUS SOLUTION, PIGGYBACK (ML) INTRAVENOUS at 18:19:00

## 2023-08-22 RX ADMIN — HEPARIN SODIUM 16000 UNITS: 1000 INJECTION, SOLUTION INTRAVENOUS; SUBCUTANEOUS at 11:19:00

## 2023-08-22 RX ADMIN — NITROGLYCERIN 15 MCG/MIN: 20 INJECTION INTRAVENOUS at 15:11:00

## 2023-08-22 RX ADMIN — HEPARIN SODIUM 1000 UNITS: 1000 INJECTION, SOLUTION INTRAVENOUS; SUBCUTANEOUS at 11:30:00

## 2023-08-22 RX ADMIN — DEXTROSE, SODIUM CHLORIDE, SODIUM LACTATE, POTASSIUM CHLORIDE, AND CALCIUM CHLORIDE: 5; .6; .31; .03; .02 INJECTION, SOLUTION INTRAVENOUS at 14:29:00

## 2023-08-22 RX ADMIN — ONDANSETRON 4 MG: 2 INJECTION INTRAMUSCULAR; INTRAVENOUS at 12:07:00

## 2023-08-22 RX ADMIN — POLYETHYLENE GLYCOL 3350 17 G: 17 POWDER, FOR SOLUTION ORAL at 17:42:00

## 2023-08-22 RX ADMIN — CLOPIDOGREL BISULFATE 300 MG: 75 TABLET ORAL at 14:36:00

## 2023-08-22 RX ADMIN — SODIUM CHLORIDE: 9 INJECTION, SOLUTION INTRAVENOUS at 10:53:00

## 2023-08-22 RX ADMIN — NITROGLYCERIN 40 MCG/MIN: 20 INJECTION INTRAVENOUS at 17:55:00

## 2023-08-22 RX ADMIN — HYDROCODONE BITARTRATE AND ACETAMINOPHEN 2 TABLET: 5; 325 TABLET ORAL at 21:12:00

## 2023-08-22 RX ADMIN — HYDROCODONE BITARTRATE AND ACETAMINOPHEN 2 TABLET: 5; 325 TABLET ORAL at 16:45:00

## 2023-08-22 RX ADMIN — NITROGLYCERIN 110 MCG/MIN: 20 INJECTION INTRAVENOUS at 16:30:00

## 2023-08-22 RX ADMIN — ROSUVASTATIN CALCIUM 40 MG: 20 TABLET, COATED ORAL at 21:06:00

## 2023-08-22 RX ADMIN — PROTAMINE SULFATE 35 MG: 10 INJECTION, SOLUTION INTRAVENOUS at 12:06:00

## 2023-08-22 RX ADMIN — ASPIRIN 81 MG: 81 TABLET, CHEWABLE ORAL at 14:38:00

## 2023-08-22 RX ADMIN — POLYETHYLENE GLYCOL 3350 17 G: 17 POWDER, FOR SOLUTION ORAL at 23:39:00

## 2023-08-22 RX ADMIN — ACETAMINOPHEN 1000 MG: 500 MG TABLET ORAL at 10:07:00

## 2023-08-22 RX ADMIN — LOSARTAN POTASSIUM 50 MG: 50 TABLET ORAL at 14:37:00

## 2023-08-22 RX ADMIN — PROTAMINE SULFATE 10 MG: 10 INJECTION, SOLUTION INTRAVENOUS at 12:19:00

## 2023-08-22 RX ADMIN — CLOPIDOGREL BISULFATE 75 MG: 75 TABLET ORAL at 14:39:00

## 2023-08-22 RX ADMIN — NITROGLYCERIN 70 MCG/MIN: 20 INJECTION INTRAVENOUS at 15:55:00

## 2023-08-22 RX ADMIN — NITROGLYCERIN 5 MCG/MIN: 20 INJECTION INTRAVENOUS at 15:05:00

## 2023-08-22 RX ADMIN — SODIUM CHLORIDE, SODIUM LACTATE, POTASSIUM CHLORIDE, CALCIUM CHLORIDE: 600; 310; 30; 20 INJECTION, SOLUTION INTRAVENOUS at 12:11:00

## 2023-08-22 RX ADMIN — NITROGLYCERIN 50 MCG/MIN: 20 INJECTION INTRAVENOUS at 17:52:00

## 2023-08-22 RX ADMIN — HYDRALAZINE HYDROCHLORIDE 10 MG: 20 INJECTION INTRAMUSCULAR; INTRAVENOUS at 21:31:00

## 2023-08-22 RX ADMIN — NITROGLYCERIN 50 MCG/MIN: 20 INJECTION INTRAVENOUS at 15:40:00

## 2023-08-22 RX ADMIN — ALLOPURINOL 300 MG: 300 TABLET ORAL at 14:36:00

## 2023-08-22 RX ADMIN — SODIUM CHLORIDE: 9 INJECTION, SOLUTION INTRAVENOUS at 12:11:00

## 2023-08-22 RX ADMIN — SODIUM CHLORIDE: 9 INJECTION, SOLUTION INTRAVENOUS at 17:11:00

## 2023-08-22 RX ADMIN — HYDRALAZINE HYDROCHLORIDE 10 MG: 20 INJECTION INTRAMUSCULAR; INTRAVENOUS at 16:25:00

## 2023-08-22 RX ADMIN — CEFAZOLIN SODIUM IN 0.9 % NACL 3 G: 3 G/100 ML INTRAVENOUS SOLUTION, PIGGYBACK (ML) INTRAVENOUS at 10:53:00

## 2023-08-22 RX ADMIN — SODIUM CHLORIDE, SODIUM LACTATE, POTASSIUM CHLORIDE, CALCIUM CHLORIDE: 600; 310; 30; 20 INJECTION, SOLUTION INTRAVENOUS at 10:32:00

## 2023-08-22 RX ADMIN — LIDOCAINE HYDROCHLORIDE 4 ML: 40 SOLUTION TOPICAL at 10:42:00

## 2023-08-22 NOTE — ANESTHESIA PROCEDURE NOTES
Airway  Date/Time: 8/22/2023 10:42 AM  Urgency: Elective    Airway not difficult    General Information and Staff    Patient location during procedure: OR  Anesthesiologist: Donaldo Jones MD  Performed: anesthesiologist   Performed by: Donaldo Jones MD  Authorized by: Donaldo Jones MD      Indications and Patient Condition  Indications for airway management: anesthesia  Sedation level: deep  Preoxygenated: yes  Patient position: sniffing  Mask difficulty assessment: 1 - vent by mask    Final Airway Details  Final airway type: endotracheal airway      Successful airway: ETT  Cuffed: yes   Successful intubation technique: direct laryngoscopy  Facilitating devices/methods: cricoid pressure  Endotracheal tube insertion site: oral  Blade: Reese  Blade size: #4  ETT size (mm): 7.0    Cormack-Lehane Classification: grade IIA - partial view of glottis  Placement verified by: capnometry   Measured from: teeth  ETT to teeth (cm): 22  Number of attempts at approach: 1

## 2023-08-22 NOTE — ANESTHESIA PROCEDURE NOTES
Arterial Line    Date/Time: 8/22/2023 10:34 AM    Performed by: Denny Obregon MD  Authorized by: Denny Obregon MD    General Information and Staff    Procedure Start:  8/22/2023 10:34 AM  Procedure End:  8/22/2023 10:36 AM  Anesthesiologist:  Denny Obregon MD  Performed By:  Anesthesiologist  Patient Location:  OR  Indication: continuous blood pressure monitoring and blood sampling needed    Site Identification: surface landmarks    Preanesthetic Checklist: 2 patient identifiers, IV checked, risks and benefits discussed, monitors and equipment checked, pre-op evaluation, timeout performed, anesthesia consent and sterile technique used    Procedure Details    Catheter Size:  20 G  Catheter Length:  1 and 3/4 inch  Catheter Type:  Arrow  Seldinger Technique?: Yes    Laterality:  Right  Site:  Radial artery  Site Prep: chlorhexidine    Line Secured:  Wrist Brace, tape and Tegaderm    Assessment    Events: patient tolerated procedure well with no complications      Medications  8/22/2023 10:34 AM      Additional Comments

## 2023-08-22 NOTE — ANESTHESIA PROCEDURE NOTES
Peripheral IV  Date/Time: 8/22/2023 10:45 AM  Inserted by: Keyla Ansari MD    Placement  Needle size: 18 G  Laterality: left  Location: forearm  Site prep: alcohol  Technique: anatomical landmarks  Attempts: 2

## 2023-08-22 NOTE — PROGRESS NOTES
Critical Care    Called with /102 despite Nitroglycerin drip 90 mcg/min and he received oral his oral Amlodipine and Losartan. He has prn IV Metoprolol but HR 50s. I added IV Hydralazine prn. Will notify Cardiology.     Jarod Ferrer NP  Critical Care

## 2023-08-22 NOTE — H&P
[facility]    PATIENT'S NAME: Bakari Lopez CELIA   ATTENDING PHYSICIAN: Mary Anne Nance. Saran Romero MD   PATIENT ACCOUNT#:   [id number]    LOCATION:  [facility] [address] [facility] [address]  MEDICAL RECORD #:   [id number]       YOB: 1960  ADMISSION DATE:       08/22/2023    HISTORY AND PHYSICAL EXAMINATION    HISTORY OF PRESENT ILLNESS:  This is a 58-year-old white male. When I saw him today, he was aware of my being involved in his procedure with a 5.3 cm abdominal aortic aneurysm. The patient was seen with his sister present at the bedside. Also saw with Gregg Garcia. The patient has no abdominal pain or back pain. He was diagnosed with an aneurysm after he had an accident while up in [place] and was found to have a hemopneumothorax. Unfortunately, he was not treated in [place] until he came down to [facility], was at [facility], and under direction of Dr. Marcelle Scott had a diagnosis of hemothorax followed by Dr. Quincy Frazier. This was treated surgically. At that time is when he found out he had the aneurysm. It has been followed and has been increasing in size. Although, it went down to 4.7 to 4.8 by elliptical imaging. It got up to 5.3 cm. He denies chest pain or shortness of breath. He has history of PTCA of the coronary arteries, though it was done 1 year ago by [facility] with recent stress test showing no reversible ischemia. PAST MEDICAL HISTORY:  Hypertension, dyslipidemia, as well as sleep apnea. PAST SURGICAL HISTORY:  Left thigh surgery, lung surgery, carpal tunnel release, bilateral shoulder arthroscopies, tonsillectomy. MEDICATIONS:  Ticagrelor and was switched over to Plavix. He is on aspirin, Crestor, allopurinol, levothyroxine, lisinopril, omeprazole. ALLERGIES:  He states he has allergies to lactose. FAMILY HISTORY:  Breast cancer in mother and kidney problems in father. SOCIAL HISTORY:  The patient is single.   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 He occasionally smokes cigars and pipes. He has 2 glasses of wine per day. REVIEW OF SYSTEMS:  He denies any CVA, TIA, visual field defects, or aphasia. He has no gangrene, tissue loss, ulceration, or rest pain of lower legs. He denies any hematuria, dysuria, hemoptysis, cough, sputum production, weight loss, fever, or chills. PHYSICAL EXAMINATION:    GENERAL:  He was awake and alert. No acute distress. VITAL SIGNS:  His blood pressure was 136/70, heart rate 82, respiratory rate 20. He is morbidly obese. He said his weight is 290 pounds. HEENT:  His pupils are equal, round, and reactive. Sclerae clear. Mouth without lesions. LUNGS:  Breath sounds bilaterally. HEART:  Normal.  ABDOMEN:  Soft, obese with large abdominal pannus over the groin. EXTREMITIES:  Femoral, popliteal, and pedal pulses all appear to be palpable. Upper extremity pulses are palpable. NEUROLOGIC:  He is moving all 4 extremities. LABORATORY DATA:  His last labs showed creatinine of 0.64 and BUN of 16. His hemoglobin was stable. CT angiogram was reviewed. IMPRESSION:  A 76-year-old white male with history of coronary artery disease, dyslipidemia, hypertension, morbid obesity, past history of lung surgery for trauma with an enlarged abdominal aortic aneurysm. I have recommended a stent graft repair. I have reviewed and discussed this also with Dr. Marielle Freeman and discussed with the patient and his sister here at the bedside the risks of death, myocardial infarction, bleeding, infection, limb loss, graft thrombosis, future limb loss, future graft thrombosis, renal failure, colon ischemia, multisystem organ failure, endoleak, graft structural fatigue. I stressed the importance of the need for followup for the rest of his life either with ultrasound or CT scan. We will try to limit the amount of CT contrast to try to avoid future problems with the kidneys.   The patient states that he has a history of family history of breast cancer in his mother, kidney problems in his father, but no history of aneurysm that he can recall. All questions answered. The options of no treatment versus open repair were also discussed. He is well aware of the open repair with risks and complications, and agreeable for the stent graft repair.     Dictated By Charo Josue MD  d: 08/22/2023 10:35:19  t: 08/22/2023 10:39:48  Job [id number]  JJW/

## 2023-08-22 NOTE — H&P
[facility]    PATIENT'S NAME: Jhony YANG   ATTENDING PHYSICIAN: Mahendra Nobles. Ernesto Burnette MD   PATIENT ACCOUNT#:   [id number]    LOCATION:  [facility] 11 [facility] 10  MEDICAL RECORD #:   [id number]       YOB: 1960  ADMISSION DATE:       08/22/2023    HISTORY AND PHYSICAL EXAMINATION    HISTORY OF PRESENT ILLNESS:  This is a 58-year-old white male. When I saw him today, he was aware of my being involved in his procedure with a 5.3 cm abdominal aortic aneurysm. The patient was seen with his sister present at the bedside. Also saw with Tarah Garcia. The patient has no abdominal pain or back pain. He was diagnosed with an aneurysm after he had an accident while up in [place] and was found to have a hemopneumothorax. Unfortunately, he was not treated in [place] until he came down to [facility], was at [facility] [place] [facility] AND [facility], and under direction of Dr. Hermelindo Harley had a diagnosis of hemothorax followed by Dr. Corry Mckinnon. This was treated surgically. At that time is when he found out he had the aneurysm. It has been followed and has been increasing in size. Although, it went down to 4.7 to 4.8 by elliptical imaging. It got up to 5.3 cm. He denies chest pain or shortness of breath. He has history of PTCA of the coronary arteries, though it was done 1 year ago by [facility] with recent stress test showing no reversible ischemia. PAST MEDICAL HISTORY:  Hypertension, dyslipidemia, as well as sleep apnea. PAST SURGICAL HISTORY:  Left thigh surgery, lung surgery, carpal tunnel release, bilateral shoulder arthroscopies, tonsillectomy. MEDICATIONS:  Ticagrelor and was switched over to Plavix. He is on aspirin, Crestor, allopurinol, levothyroxine, lisinopril, omeprazole. ALLERGIES:  He states he has allergies to lactose. FAMILY HISTORY:  Breast cancer in mother and kidney problems in father. SOCIAL HISTORY:  The patient is single.   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 He occasionally smokes cigars and pipes. He has 2 glasses of wine per day. REVIEW OF SYSTEMS:  He denies any CVA, TIA, visual field defects, or aphasia. He has no gangrene, tissue loss, ulceration, or rest pain of lower legs. He denies any hematuria, dysuria, hemoptysis, cough, sputum production, weight loss, fever, or chills. PHYSICAL EXAMINATION:    GENERAL:  He was awake and alert. No acute distress. VITAL SIGNS:  His blood pressure was 136/70, heart rate 82, respiratory rate 20. He is morbidly obese. He said his weight is 290 pounds. HEENT:  His pupils are equal, round, and reactive. Sclerae clear. Mouth without lesions. LUNGS:  Breath sounds bilaterally. HEART:  Normal.  ABDOMEN:  Soft, obese with large abdominal pannus over the groin. EXTREMITIES:  Femoral, popliteal, and pedal pulses all appear to be palpable. Upper extremity pulses are palpable. NEUROLOGIC:  He is moving all 4 extremities. LABORATORY DATA:  His last labs showed creatinine of 0.64 and BUN of 16. His hemoglobin was stable. CT angiogram was reviewed. IMPRESSION:  A 58-year-old white male with history of coronary artery disease, dyslipidemia, hypertension, morbid obesity, past history of lung surgery for trauma with an enlarged abdominal aortic aneurysm. I have recommended a stent graft repair. I have reviewed and discussed this also with Dr. Marielle Freeman and discussed with the patient and his sister here at the bedside the risks of death, myocardial infarction, bleeding, infection, limb loss, graft thrombosis, future limb loss, future graft thrombosis, renal failure, colon ischemia, multisystem organ failure, endoleak, graft structural fatigue. I stressed the importance of the need for followup for the rest of his life either with ultrasound or CT scan. We will try to limit the amount of CT contrast to try to avoid future problems with the kidneys.   The patient states that he has a history of family history of breast cancer in his mother, kidney problems in his father, but no history of aneurysm that he can recall. All questions answered. The options of no treatment versus open repair were also discussed. He is well aware of the open repair with risks and complications, and agreeable for the stent graft repair.     Dictated By Fiordaliza Jiménez MD  d: 08/22/2023 10:35:19  t: 08/22/2023 10:39:48  [facility] [id number]  JJW/

## 2023-08-23 VITALS
HEIGHT: 74 IN | OXYGEN SATURATION: 95 % | HEART RATE: 80 BPM | SYSTOLIC BLOOD PRESSURE: 113 MMHG | TEMPERATURE: 97 F | RESPIRATION RATE: 15 BRPM | WEIGHT: 297.63 LBS | DIASTOLIC BLOOD PRESSURE: 89 MMHG | BODY MASS INDEX: 38.2 KG/M2

## 2023-08-23 LAB
ALBUMIN SERPL-MCNC: 3.2 G/DL (ref 3.4–5)
ALBUMIN/GLOB SERPL: 1.2 {RATIO} (ref 1–2)
ALP LIVER SERPL-CCNC: 49 U/L
ALT SERPL-CCNC: 36 U/L
ANION GAP SERPL CALC-SCNC: 6 MMOL/L (ref 0–18)
AST SERPL-CCNC: 30 U/L (ref 15–37)
BILIRUB SERPL-MCNC: 0.3 MG/DL (ref 0.1–2)
BUN BLD-MCNC: 9 MG/DL (ref 7–18)
BUN/CREAT SERPL: 16.4 (ref 10–20)
CALCIUM BLD-MCNC: 8.9 MG/DL (ref 8.5–10.1)
CHLORIDE SERPL-SCNC: 109 MMOL/L (ref 98–112)
CO2 SERPL-SCNC: 23 MMOL/L (ref 21–32)
CREAT BLD-MCNC: 0.55 MG/DL
DEPRECATED RDW RBC AUTO: 48.7 FL (ref 35.1–46.3)
EGFRCR SERPLBLD CKD-EPI 2021: 111 ML/MIN/1.73M2 (ref 60–?)
ERYTHROCYTE [DISTWIDTH] IN BLOOD BY AUTOMATED COUNT: 14.8 % (ref 11–15)
GLOBULIN PLAS-MCNC: 2.6 G/DL (ref 2.8–4.4)
GLUCOSE BLD-MCNC: 113 MG/DL (ref 70–99)
GLUCOSE BLDC GLUCOMTR-MCNC: 119 MG/DL (ref 70–99)
HCT VFR BLD AUTO: 29.9 %
HGB BLD-MCNC: 9.4 G/DL
MCH RBC QN AUTO: 28 PG (ref 26–34)
MCHC RBC AUTO-ENTMCNC: 31.4 G/DL (ref 31–37)
MCV RBC AUTO: 89 FL
OSMOLALITY SERPL CALC.SUM OF ELEC: 285 MOSM/KG (ref 275–295)
PLATELET # BLD AUTO: 186 10(3)UL (ref 150–450)
POTASSIUM SERPL-SCNC: 3.3 MMOL/L (ref 3.5–5.1)
PROT SERPL-MCNC: 5.8 G/DL (ref 6.4–8.2)
RBC # BLD AUTO: 3.36 X10(6)UL
SODIUM SERPL-SCNC: 138 MMOL/L (ref 136–145)
WBC # BLD AUTO: 11.9 X10(3) UL (ref 4–11)

## 2023-08-23 PROCEDURE — 99239 HOSP IP/OBS DSCHRG MGMT >30: CPT | Performed by: HOSPITALIST

## 2023-08-23 RX ORDER — AMLODIPINE BESYLATE 10 MG/1
10 TABLET ORAL DAILY
Status: DISCONTINUED | OUTPATIENT
Start: 2023-08-23 | End: 2023-08-23

## 2023-08-23 RX ORDER — AMLODIPINE BESYLATE 10 MG/1
10 TABLET ORAL DAILY
Qty: 30 TABLET | Refills: 3 | Status: SHIPPED | OUTPATIENT
Start: 2023-08-24

## 2023-08-23 RX ORDER — DOCUSATE SODIUM 100 MG/1
100 CAPSULE, LIQUID FILLED ORAL 2 TIMES DAILY
Status: DISCONTINUED | OUTPATIENT
Start: 2023-08-23 | End: 2023-08-23

## 2023-08-23 RX ORDER — SIMETHICONE 80 MG
80 TABLET,CHEWABLE ORAL 4 TIMES DAILY PRN
Status: DISCONTINUED | OUTPATIENT
Start: 2023-08-23 | End: 2023-08-23

## 2023-08-23 RX ORDER — POTASSIUM CHLORIDE 20 MEQ/1
40 TABLET, EXTENDED RELEASE ORAL EVERY 4 HOURS
Status: COMPLETED | OUTPATIENT
Start: 2023-08-23 | End: 2023-08-23

## 2023-08-23 RX ORDER — POLYETHYLENE GLYCOL 3350 17 G/17G
17 POWDER, FOR SOLUTION ORAL DAILY PRN
Qty: 30 EACH | Refills: 0 | Status: SHIPPED | OUTPATIENT
Start: 2023-08-23

## 2023-08-23 RX ADMIN — NITROGLYCERIN 12.5 MCG/MIN: 20 INJECTION INTRAVENOUS at 02:42:00

## 2023-08-23 RX ADMIN — CLOPIDOGREL BISULFATE 75 MG: 75 TABLET ORAL at 08:20:00

## 2023-08-23 RX ADMIN — BUPROPION HYDROCHLORIDE 300 MG: 300 TABLET ORAL at 08:20:00

## 2023-08-23 RX ADMIN — HYDRALAZINE HYDROCHLORIDE 10 MG: 20 INJECTION INTRAMUSCULAR; INTRAVENOUS at 03:27:00

## 2023-08-23 RX ADMIN — AMLODIPINE BESYLATE 10 MG: 10 TABLET ORAL at 08:20:00

## 2023-08-23 RX ADMIN — HYDRALAZINE HYDROCHLORIDE 10 MG: 20 INJECTION INTRAMUSCULAR; INTRAVENOUS at 09:17:00

## 2023-08-23 RX ADMIN — LOSARTAN POTASSIUM 50 MG: 50 TABLET ORAL at 08:20:00

## 2023-08-23 RX ADMIN — CEFAZOLIN SODIUM IN 0.9 % NACL 3 G: 3 G/100 ML INTRAVENOUS SOLUTION, PIGGYBACK (ML) INTRAVENOUS at 02:51:00

## 2023-08-23 RX ADMIN — NITROGLYCERIN 25 MCG/MIN: 20 INJECTION INTRAVENOUS at 02:00:00

## 2023-08-23 RX ADMIN — POTASSIUM CHLORIDE 40 MEQ: 20 TABLET, EXTENDED RELEASE ORAL at 08:20:00

## 2023-08-23 RX ADMIN — METOPROLOL TARTRATE 2.5 MG: 5 INJECTION INTRAVENOUS at 04:50:00

## 2023-08-23 RX ADMIN — METOPROLOL TARTRATE 2.5 MG: 5 INJECTION INTRAVENOUS at 02:15:00

## 2023-08-23 RX ADMIN — ALLOPURINOL 300 MG: 300 TABLET ORAL at 08:21:00

## 2023-08-23 RX ADMIN — ASPIRIN 81 MG: 81 TABLET, CHEWABLE ORAL at 08:20:00

## 2023-08-23 RX ADMIN — HYDROCODONE BITARTRATE AND ACETAMINOPHEN 2 TABLET: 5; 325 TABLET ORAL at 01:41:00

## 2023-08-23 RX ADMIN — SIMETHICONE 80 MG: 80 MG TABLET,CHEWABLE ORAL at 08:43:00

## 2023-08-23 RX ADMIN — NITROGLYCERIN 6 MCG/MIN: 20 INJECTION INTRAVENOUS at 03:15:00

## 2023-08-23 RX ADMIN — DOCUSATE SODIUM 100 MG: 100 CAPSULE, LIQUID FILLED ORAL at 08:36:00

## 2023-08-23 RX ADMIN — POTASSIUM CHLORIDE 40 MEQ: 20 TABLET, EXTENDED RELEASE ORAL at 12:03:00

## 2023-08-23 NOTE — CM/SW NOTE
08/23/23 1400   Discharge disposition   Expected discharge disposition Home-Health   Discharge transportation Private car     Elvera Cairo MBA BSN RN Cleola Paget RN Case Manager  [phone]

## 2023-08-23 NOTE — DISCHARGE SUMMARY
[facility]    Discharge Summary    Aye Tinsley Patient Status:  Inpatient    1960 MRN [id number]   Location [facility] 2W/SW Attending Esteban Hart MD   [address] Day # 1 PCP Cristel Youisf MD     Date of Admission: 2023 Disposition: Home or Self Care     Date of Discharge: 23      Admitting Diagnosis: AAA (abdominal aortic aneurysm) [facility]) [I71.40]    Hospital Discharge Diagnoses:  AAA    Lace+ Score: 48  59-90 High Risk  29-58 Medium Risk  0-28   Low Risk. TCM Follow-Up Recommendation:  LACE > 58:  High Risk of readmission after discharge from the hospital.      Problem List: Patient Active Problem List:     Osteoarthrosis, unspecified whether generalized or localized, lower leg     Pain in limb     Pain in joint, lower leg     Osteoarthrosis, unspecified whether generalized or localized, hand     Myalgia and myositis, unspecified     Cervicalgia     Lumbago     Degeneration of cervical intervertebral disc     Degeneration of lumbar or lumbosacral intervertebral disc     Osteoarthrosis, unspecified whether generalized or localized, ankle and foot     Contracture of ankle and foot joint     Gouty arthropathy, unspecified     Spinal stenosis, lumbar region, without neurogenic claudication     Carpal tunnel syndrome     Seroma, post-traumatic ([address]     Primary osteoarthritis of left knee     Primary osteoarthritis of left wrist     Dyspnea on exertion     Rotator cuff tear arthropathy of right shoulder     Hemothorax on left     Closed fracture of multiple ribs of left side with routine healing, subsequent encounter     Plantar fasciitis, bilateral     Acquired bilateral valgus deformity of ankles     Brachial (cervical) neuritis     Foraminal stenosis of cervical region     Left cervical radiculopathy     Chronic obstructive pulmonary disease, unspecified (HCC)     CAD (coronary artery disease)     Abdominal aortic aneurysm (AAA) greater than 5.0 cm in diameter in female [facility])     Essential hypertension     Hyperlipidemia     FRANCESCA (obstructive sleep apnea)      Reason for Admission:   Abdominal aortic aneurysm (AAA) greater than 5.0 cm in diameter (HCC)  Chronic obstructive pulmonary disease, unspecified (HCC)  CAD (coronary artery disease)  Essential hypertension  Hyperlipidemia  FRANCESCA (obstructive sleep apnea)  DMII    Physical Exam:   See PN      History of Present Illness:   Per Dr. Masha Foreman  This is a 43-year-old white male. When I saw him today, he was aware of my being involved in his procedure with a 5.3 cm abdominal aortic aneurysm. The patient was seen with his sister present at the bedside. Also saw with Layton Garcia. The patient has no abdominal pain or back pain. He was diagnosed with an aneurysm after he had an accident while up in [place] and was found to have a hemopneumothorax. Unfortunately, he was not treated in [place] until he came down to [facility], was at [facility] [place] [facility], and under direction of Dr. Mai Sheridan had a diagnosis of hemothorax followed by Dr. Ashish Aquino. This was treated surgically. At that time is when he found out he had the aneurysm. It has been followed and has been increasing in size. Although, it went down to 4.7 to 4.8 by elliptical imaging. It got up to 5.3 cm. He denies chest pain or shortness of breath. He has history of PTCA of the coronary arteries, though it was done 1 year ago by [facility] with recent stress test showing no reversible ischemia. Hospital Course:   Abdominal aortic aneurysm (AAA) greater than 5.0 cm in diameter (HCC)  S/P AAA aneurysm endovascular repair  Pain controlled  -cont aspirin and plavix     Chronic obstructive pulmonary disease, unspecified ([address]  Cont home meds. CAD (coronary artery disease)  Cont home meds. Essential hypertension  Cont home meds losartan  Added norvasc  Ambulatory bp monitoring and cardiology f/u     Hyperlipidemia  Cont home meds.       FRANCESCA (obstructive sleep apnea)  FRANCESCA protocol, monitor. DMII  -new diagnosis  -hba1c 6.5  -plan op follow-up with pcp  -discussed dietary modification    Consultations:   Vascular Surgery  IR  Cardiology    Procedures:   S/P AAA aneurysm endovascular repair    Complications: n/a    Discharge Condition: Good    Discharge Medications     Medication List        START taking these medications      amLODIPine 10 MG Tabs  Commonly known as: Norvasc  Take 1 tablet (10 mg total) by mouth daily. Start taking on: August 24, 2023     phenylephrine-min oil-hector 0.25-14-74.9 % Oint  Commonly known as: Formula R  Place 1 g rectally 2 (two) times daily. Polyethylene Glycol 3350 17 g Pack  Commonly known as: MIRALAX  Take 17 g by mouth daily as needed (If no bowel movement in last 24 hours). Sennosides 17.2 MG Tabs  Take 1 tablet (17.2 mg total) by mouth nightly as needed (constipation, as needed if no bowel movement that day). CONTINUE taking these medications      allopurinol 300 MG Tabs  Commonly known as: Zyloprim     aspirin 81 MG Chew  Chew 1 tablet (81 mg total) by mouth daily. Breo Ellipta 100-25 MCG/ACT Aepb  Generic drug: fluticasone furoate-vilanterol  Inhale 1 puff into the lungs daily. Follow with mouth rinse and spit     buPROPion  MG Tb24  Commonly known as:  Wellbutrin XL     clopidogrel 75 MG Tabs  Commonly known as: Plavix     DULoxetine 30 MG Cpep  Commonly known as: Cymbalta     levothyroxine 88 MCG Tabs  Commonly known as: Synthroid     losartan 50 MG Tabs  Commonly known as: Cozaar     Omeprazole 40 MG Cpdr     rosuvastatin 40 MG Tabs  Commonly known as: Crestor               Where to Get Your Medications        These medications were sent to [address], [address] [facility] [address] [place] [address]      Phone: [phone]   amLODIPine 10 MG Tabs  phenylephrine-min oil-hector 0.25-14-74.9 % Oint  Polyethylene Glycol 3350 17 g Pack  Sennosides 17.2 MG Tabs Follow up Visits:  Follow-up with PCP in 1 week    Follow up Labs: n/a     Other Discharge Instructions: follow-up with IR and vascular surgery as instructed    Nuno Carbone MD  8/23/2023  11:02 AM    > 35 min

## 2023-08-23 NOTE — DISCHARGE INSTRUCTIONS
No driving 10 days. No lifting more than 5 pounds for 4 weeks. No shower 2 days. Call office for temperature>100. 5/ wound drainage. No constricting underwear on incisions.   Follow-up with PCP In 1-2 weeks  Check blood pressure 2x daily take results to cardiology follow-up after discharge  Call MD with any concerns

## 2023-08-23 NOTE — OPERATIVE REPORT
[facility]    PATIENT'S NAME: Elio YANG   ATTENDING PHYSICIAN: Gertrude Dubose MD   OPERATING PHYSICIAN: Lance Novoa MD   PATIENT ACCOUNT#:   [id number]    LOCATION:  [address] #:   [id number]       YOB: 1960  ADMISSION DATE:       08/22/2023      OPERATION DATE:  08/22/2023    OPERATIVE REPO    PREOPERATIVE DIAGNOSIS:  Enlarging abdominal aortic aneurysm of 5.3 cm with some slight ectasia of the left iliac artery. POSTOPERATIVE DIAGNOSIS:  Enlarging abdominal aortic aneurysm of 5.3 cm with some slight ectasia of the left iliac artery. PROCEDURE:    1. Duplex ultrasound and fluoroscopy imaging of the right and left common femoral artery with percutaneous access with 2 Perclose closure devices of the right and left common femoral arteries. 2.   Aortic angiogram with pigtail catheter via the left femoral artery. 3.   Medtronic stent graft repair of abdominal aortic aneurysm, main body right of 28 x 14 x 103 mm, with a contralateral limb through the left side of 16 x 20 x 146 mm, with a right limb extension of 16 x 16 x 124 mm. 4.   Removal of sheath and percutaneous closure. CO-SURGEONS:  Lance Novoa MD and Julia Vasquez. Pankaj La MD     INDICATIONS:  This is a 57-year-old white male who has been followed by Dr. Adebayo Spencer and Dr. Diego Louise with a diagnosis of abdominal aortic aneurysm that was found after a lung trauma years ago treated at [facility].  It has increased in size and was read as 4.4, and then up to 4.7 cm, and when I look at this last one elliptical, the largest measurement was 5.3 cm. He was recommended for a stent graft repair. Patient is morbidly obese with a history of coronary artery disease.   I discussed the procedure, risks, and complications prior to procedure with death, myocardial infarction, bleeding, infection, graft thrombosis, limb loss, future graft thrombosis, future limb loss, renal failure, colon ischemia, multisystem organ failure, endoleak, graft structural fatigue, and need for followup versus the risks of open repair versus just observation. The patient agreed. All questions answered. OPERATIVE TECHNIQUE:  Patient placed supine on procedure table in angio suite. Underwent general anesthesia. Arterial lines and IV started by Anesthesia, Chopra catheter placed by Surgery. The abdominal pannus was taped up out of the wound so the femoral arteries could be exposed. The abdomen was prepped and draped in usual sterile technique with Ioban Vi-Drapes used to cover the operative field. SCDs on both lower legs. After a time-out was done, the patient had fluoroscopic and ultrasound imaging of the femoral arteries with puncture of the femoral arteries with micropuncture technique with placement of a micropuncture wire, enlargement of the incisions bilaterally, and then placement of the micropuncture sheath, and then eventually a J-wire. The artery was dilated with a 6-Burkinan bilaterally and then eventually Perclose closure devices x2 were placed and put to the side with hemostats on them. Then, an 8-Burkinan sheath was placed on both sides. A pigtail catheter was placed up on the left side. Angiogram was performed with proper cranial and caudal views showing the renal arteries, aneurysm, the neck of the aneurysm, the iliac arteries, the aortic bifurcation, and the iliac bifurcation. The patient, after being fully anticoagulated prior to this, ACTs were up to 275 and further heparin was given, then had Kumpe catheters placed on both sides with Lunderquist guidewires placed. Sheaths were removed after appropriate time with the anticoagulation, and on the right side was placed a main device from Medtronic of 28 x 14 x 103 mm.   The graft was placed up to the renal arteries and slowly deployed with the blood pressure controlled and proper ankle imaging just below the left renal artery which was the lower artery. After deployment of the stent graft, the suprarenal fixation device was then deployed. It appeared to fit very well in that area. The remaining portion of the ipsilateral limb was deployed. This was stopped at that time. Contralateral limb was cannulated with the use of a Kumpe catheter and guidewire _glide guidewire with _______ rotation of the Kumpe catheter, and eventually a pigtail catheter within the graft. A Lunderquist guidewire was placed through the Kumpe catheter, and then the contralateral limb was placed after removal of the 8-Luxembourgish sheath of a 16 x 20 x 146 mm Medtronic extension. Proper overlap was done _of the______ right iliac bifurcation. Conical views were also done to make sure we confirmed it was the right side after pigtail catheter was placed for proper measurements. On the right side, the main portion of ipsilateral was deployed. Pigtail catheter was placed with a Lunderquist after the initial devices were removed with the cone intact without causing any problems. Then, the measurements were done, and the right limb extension of 16 x 16 x 124 mm was deployed with proper overlap __of the_____  right iliac bifurcation. The patient then had Reliant balloon catheters x2 brought up on both sides. The graft was splayed up against the wall proximally on both ankles and on both the overlapped areas and then distally. Repeat angiogram was performed and showed normal perfusion of both renal arteries and both internal iliac arteries. No evidence of any type 1, type 2, or type 3 endoleak. There was a type 2 endoleak with a lumbar, maybe cremasteric artery which was open before the procedure. I decided not to treat it at this time.       At the completion of this procedure, the patient then had the Lunderquist removed prior to the final angiogram.  When the J-wires had been placed, the patient then had the sheaths removed bilaterally as the right Medtronic extension sheath was replaced with a 16-Greek Medtronic sheath, and a 12-Greek Medtronic sheath was placed on the left side. Perclose closure devices gave good hemostasis on both the right and left side. After this was done, the area was anesthetized bilaterally with about 20 mL of lidocaine 1%, and the wounds were closed with interrupted 3-0 and running 3-0 Vicryl and 4-0 Vicryl subcuticular. The estimated blood loss was about maybe 100 mL to 150 mL. Sponge and instrument counts were correct. No pathology specimen. The patient had 100 mL of contrast.  The patient had normal pulses in the feet at the end of the procedure. The total fluoroscopy time was 14.2 minutes with a DAP of [phone]. Dictated By Lance Novoa MD  d: 08/22/2023 12:39:34  t: 08/22/2023 17:02:21  [facility] [id number]  QNS/    cc: MD Julia Cuellar.  Hulon Ku, MD Dr. Stephan Schirmer, MD

## 2023-08-23 NOTE — CONSULTS
[facility]    PATIENT'S NAME: Jenny YANG   ATTENDING PHYSICIAN: Obdulia Coleman MD   CONSULTING PHYSICIAN: Geraldine Umaña MD   PATIENT ACCOUNT#:   [id number]    LOCATION:  [address] [place] [address] Street #:   [id number]       YOB: 1960  ADMISSION DATE:       08/22/2023      CONSULT DATE:  08/23/2023    REPORT OF CONSULTATION    FOLLOWUP CONSULTATION    This 47-year-old white male underwent endovascular stent graft repair of an aortic aneurysm. Overall, doing well. He is ambulating. No chest pain, no shortness of breath. No abdominal pain. Groin wounds are intact. His blood pressure was slightly elevated last night. He was on nitroglycerin and received an extra amount of Lopressor. He is being followed by [facility], seen by Dr. Batool Dubon yesterday. His abdomen is soft, no tenderness or masses. Pulses are palpable. He is neurologically intact. He was instructed on wound care and activity. He will follow up in the office, will try to see here at [facility] if it is possible. He has been instructed on no heavy lifting, no driving for at least for 10 days. Keep the wounds clean and dry for a couple days. Try not to strain and no constricting garments over the groin wounds. Patient had all questions answered. His labs were reviewed. His renal function seems to be doing quite well and his CBC was 9.4, will continue to observe. He will be able to be discharged later on today if his blood pressure is controlled and okay with Dr. Geraldine Feliz and [facility].       Dictated By Geraldine Umaña MD  d: 08/23/2023 06:27:38  t: 08/23/2023 07:01:48  Job [id number]  [facility]/

## 2023-08-26 LAB
BLOOD TYPE BARCODE: 6200
UNIT VOLUME: 350 ML

## 2023-09-18 ENCOUNTER — LAB ENCOUNTER (OUTPATIENT)
Dept: LAB | Facility: HOSPITAL | Age: 63
End: 2023-09-18
Attending: NURSE PRACTITIONER
Payer: COMMERCIAL

## 2023-09-18 DIAGNOSIS — Z95.5 STENTED CORONARY ARTERY: ICD-10-CM

## 2023-09-18 DIAGNOSIS — I25.10 CORONARY ARTERY DISEASE: Primary | ICD-10-CM

## 2023-09-18 DIAGNOSIS — R06.09 DYSPNEA ON EXERTION: ICD-10-CM

## 2023-09-18 LAB
ALBUMIN SERPL-MCNC: 3.6 G/DL (ref 3.4–5)
ALBUMIN/GLOB SERPL: 1.1 {RATIO} (ref 1–2)
ALP LIVER SERPL-CCNC: 60 U/L
ALT SERPL-CCNC: 33 U/L
ANION GAP SERPL CALC-SCNC: 7 MMOL/L (ref 0–18)
AST SERPL-CCNC: 22 U/L (ref 15–37)
BASOPHILS # BLD AUTO: 0.03 X10(3) UL (ref 0–0.2)
BASOPHILS NFR BLD AUTO: 0.5 %
BILIRUB SERPL-MCNC: 0.3 MG/DL (ref 0.1–2)
BUN BLD-MCNC: 11 MG/DL (ref 7–18)
BUN/CREAT SERPL: 16.7 (ref 10–20)
CALCIUM BLD-MCNC: 9.7 MG/DL (ref 8.5–10.1)
CHLORIDE SERPL-SCNC: 108 MMOL/L (ref 98–112)
CHOLEST SERPL-MCNC: 138 MG/DL (ref ?–200)
CO2 SERPL-SCNC: 27 MMOL/L (ref 21–32)
CREAT BLD-MCNC: 0.66 MG/DL
DEPRECATED RDW RBC AUTO: 50.7 FL (ref 35.1–46.3)
EGFRCR SERPLBLD CKD-EPI 2021: 105 ML/MIN/1.73M2 (ref 60–?)
EOSINOPHIL # BLD AUTO: 0.35 X10(3) UL (ref 0–0.7)
EOSINOPHIL NFR BLD AUTO: 5.5 %
ERYTHROCYTE [DISTWIDTH] IN BLOOD BY AUTOMATED COUNT: 15.7 % (ref 11–15)
FASTING PATIENT LIPID ANSWER: YES
FASTING STATUS PATIENT QL REPORTED: YES
GLOBULIN PLAS-MCNC: 3.2 G/DL (ref 2.8–4.4)
GLUCOSE BLD-MCNC: 142 MG/DL (ref 70–99)
HCT VFR BLD AUTO: 31.4 %
HDLC SERPL-MCNC: 40 MG/DL (ref 40–59)
HGB BLD-MCNC: 9.4 G/DL
IMM GRANULOCYTES # BLD AUTO: 0.04 X10(3) UL (ref 0–1)
IMM GRANULOCYTES NFR BLD: 0.6 %
LDLC SERPL CALC-MCNC: 48 MG/DL (ref ?–100)
LYMPHOCYTES # BLD AUTO: 1.12 X10(3) UL (ref 1–4)
LYMPHOCYTES NFR BLD AUTO: 17.5 %
MCH RBC QN AUTO: 26.6 PG (ref 26–34)
MCHC RBC AUTO-ENTMCNC: 29.9 G/DL (ref 31–37)
MCV RBC AUTO: 89 FL
MONOCYTES # BLD AUTO: 0.57 X10(3) UL (ref 0.1–1)
MONOCYTES NFR BLD AUTO: 8.9 %
NEUTROPHILS # BLD AUTO: 4.28 X10 (3) UL (ref 1.5–7.7)
NEUTROPHILS # BLD AUTO: 4.28 X10(3) UL (ref 1.5–7.7)
NEUTROPHILS NFR BLD AUTO: 67 %
NONHDLC SERPL-MCNC: 98 MG/DL (ref ?–130)
OSMOLALITY SERPL CALC.SUM OF ELEC: 296 MOSM/KG (ref 275–295)
PLATELET # BLD AUTO: 173 10(3)UL (ref 150–450)
POTASSIUM SERPL-SCNC: 4.4 MMOL/L (ref 3.5–5.1)
PROT SERPL-MCNC: 6.8 G/DL (ref 6.4–8.2)
RBC # BLD AUTO: 3.53 X10(6)UL
SODIUM SERPL-SCNC: 142 MMOL/L (ref 136–145)
TRIGL SERPL-MCNC: 328 MG/DL (ref 30–149)
VLDLC SERPL CALC-MCNC: 47 MG/DL (ref 0–30)
WBC # BLD AUTO: 6.4 X10(3) UL (ref 4–11)

## 2023-09-18 PROCEDURE — 36415 COLL VENOUS BLD VENIPUNCTURE: CPT

## 2023-09-18 PROCEDURE — 80053 COMPREHEN METABOLIC PANEL: CPT

## 2023-09-18 PROCEDURE — 85025 COMPLETE CBC W/AUTO DIFF WBC: CPT

## 2023-09-18 PROCEDURE — 80061 LIPID PANEL: CPT

## 2023-12-29 ENCOUNTER — LAB ENCOUNTER (OUTPATIENT)
Dept: LAB | Facility: HOSPITAL | Age: 63
End: 2023-12-29
Attending: INTERNAL MEDICINE
Payer: COMMERCIAL

## 2023-12-29 DIAGNOSIS — I25.10 CAD (CORONARY ARTERY DISEASE): Primary | ICD-10-CM

## 2023-12-29 DIAGNOSIS — E78.5 HYPERLIPIDEMIA: ICD-10-CM

## 2023-12-29 LAB
CHOLEST SERPL-MCNC: 129 MG/DL (ref ?–200)
FASTING PATIENT LIPID ANSWER: YES
HDLC SERPL-MCNC: 43 MG/DL (ref 40–59)
LDLC SERPL CALC-MCNC: 42 MG/DL (ref ?–100)
NONHDLC SERPL-MCNC: 86 MG/DL (ref ?–130)
TRIGL SERPL-MCNC: 290 MG/DL (ref 30–149)
VLDLC SERPL CALC-MCNC: 40 MG/DL (ref 0–30)

## 2023-12-29 PROCEDURE — 80061 LIPID PANEL: CPT

## 2023-12-29 PROCEDURE — 36415 COLL VENOUS BLD VENIPUNCTURE: CPT

## 2024-03-12 ENCOUNTER — HOSPITAL ENCOUNTER (OUTPATIENT)
Dept: CT IMAGING | Facility: HOSPITAL | Age: 64
Discharge: HOME | End: 2024-03-12
Attending: RADIOLOGY
Payer: COMMERCIAL

## 2024-03-12 DIAGNOSIS — I71.40 AAA (ABDOMINAL AORTIC ANEURYSM) (HCC): ICD-10-CM

## 2024-03-12 LAB
CREAT BLD-MCNC: 0.7 MG/DL
EGFRCR SERPLBLD CKD-EPI 2021: 102 ML/MIN/1.73M2 (ref 60–?)

## 2024-03-12 PROCEDURE — 74174 CTA ABD&PLVS W/CONTRAST: CPT | Performed by: RADIOLOGY

## 2024-03-12 PROCEDURE — 82565 ASSAY OF CREATININE: CPT

## 2024-03-25 RX ORDER — PREDNISONE 20 MG/1
TABLET ORAL
COMMUNITY
Start: 2023-11-14

## 2024-03-25 RX ORDER — FLUTICASONE PROPIONATE 50 MCG
1 SPRAY, SUSPENSION (ML) NASAL DAILY
COMMUNITY
Start: 2022-04-22

## 2024-03-25 RX ORDER — LISINOPRIL 10 MG/1
TABLET ORAL
COMMUNITY
Start: 2022-03-03

## 2024-03-25 RX ORDER — BUSPIRONE HYDROCHLORIDE 15 MG/1
1 TABLET ORAL 2 TIMES DAILY
COMMUNITY
Start: 2022-04-18

## 2024-03-25 NOTE — PAT NURSING NOTE
Patient scheduled for colonoscopy with Dr. Cosme on 03/29/24. Patient took Mounjaro injection on Saturday 3/23/24 at 1900. Per anesthesia guidelines, pt to hold 7 days. Spoke with anesthesia charge, Dr. Martinez and he OK'd patient to proceed with colonoscopy procedure on 03/29/24. Pt informed. Patient stated he has received his instructions regarding holding his Plavix. Patient stated he has all bowel prep instructions and no further questions at this time.

## 2024-03-29 ENCOUNTER — HOSPITAL ENCOUNTER (OUTPATIENT)
Facility: HOSPITAL | Age: 64
Setting detail: HOSPITAL OUTPATIENT SURGERY
Discharge: HOME | End: 2024-03-29
Attending: INTERNAL MEDICINE | Admitting: INTERNAL MEDICINE
Payer: COMMERCIAL

## 2024-03-29 ENCOUNTER — ANESTHESIA EVENT (OUTPATIENT)
Dept: ENDOSCOPY | Facility: HOSPITAL | Age: 64
End: 2024-03-29
Payer: COMMERCIAL

## 2024-03-29 ENCOUNTER — ANESTHESIA (OUTPATIENT)
Dept: ENDOSCOPY | Facility: HOSPITAL | Age: 64
End: 2024-03-29
Payer: COMMERCIAL

## 2024-03-29 VITALS
SYSTOLIC BLOOD PRESSURE: 130 MMHG | OXYGEN SATURATION: 100 % | RESPIRATION RATE: 17 BRPM | WEIGHT: 295 LBS | BODY MASS INDEX: 37.86 KG/M2 | HEIGHT: 74 IN | HEART RATE: 58 BPM | DIASTOLIC BLOOD PRESSURE: 73 MMHG

## 2024-03-29 DIAGNOSIS — K64.8 BLEEDING INTERNAL HEMORRHOIDS: ICD-10-CM

## 2024-03-29 LAB — GLUCOSE BLDC GLUCOMTR-MCNC: 121 MG/DL (ref 70–99)

## 2024-03-29 PROCEDURE — 82962 GLUCOSE BLOOD TEST: CPT

## 2024-03-29 PROCEDURE — 0DBL8ZX EXCISION OF TRANSVERSE COLON, VIA NATURAL OR ARTIFICIAL OPENING ENDOSCOPIC, DIAGNOSTIC: ICD-10-PCS | Performed by: INTERNAL MEDICINE

## 2024-03-29 PROCEDURE — 88305 TISSUE EXAM BY PATHOLOGIST: CPT | Performed by: INTERNAL MEDICINE

## 2024-03-29 PROCEDURE — 0DBM8ZX EXCISION OF DESCENDING COLON, VIA NATURAL OR ARTIFICIAL OPENING ENDOSCOPIC, DIAGNOSTIC: ICD-10-PCS | Performed by: INTERNAL MEDICINE

## 2024-03-29 RX ORDER — ONDANSETRON 2 MG/ML
4 INJECTION INTRAMUSCULAR; INTRAVENOUS ONCE AS NEEDED
OUTPATIENT
Start: 2024-03-29 | End: 2024-03-29

## 2024-03-29 RX ORDER — SODIUM CHLORIDE, SODIUM LACTATE, POTASSIUM CHLORIDE, CALCIUM CHLORIDE 600; 310; 30; 20 MG/100ML; MG/100ML; MG/100ML; MG/100ML
INJECTION, SOLUTION INTRAVENOUS CONTINUOUS
Status: DISCONTINUED | OUTPATIENT
Start: 2024-03-29 | End: 2024-03-29

## 2024-03-29 RX ORDER — SODIUM CHLORIDE 0.9 % (FLUSH) 0.9 %
10 SYRINGE (ML) INJECTION AS NEEDED
Status: DISCONTINUED | OUTPATIENT
Start: 2024-03-29 | End: 2024-03-29

## 2024-03-29 RX ORDER — SODIUM CHLORIDE, SODIUM LACTATE, POTASSIUM CHLORIDE, CALCIUM CHLORIDE 600; 310; 30; 20 MG/100ML; MG/100ML; MG/100ML; MG/100ML
INJECTION, SOLUTION INTRAVENOUS CONTINUOUS
OUTPATIENT
Start: 2024-03-29

## 2024-03-29 RX ORDER — MIDAZOLAM HYDROCHLORIDE 1 MG/ML
1 INJECTION INTRAMUSCULAR; INTRAVENOUS EVERY 5 MIN PRN
Status: DISCONTINUED | OUTPATIENT
Start: 2024-03-29 | End: 2024-03-29

## 2024-03-29 RX ORDER — LIDOCAINE HYDROCHLORIDE 10 MG/ML
INJECTION, SOLUTION EPIDURAL; INFILTRATION; INTRACAUDAL; PERINEURAL AS NEEDED
Status: DISCONTINUED | OUTPATIENT
Start: 2024-03-29 | End: 2024-03-29 | Stop reason: SURG

## 2024-03-29 RX ADMIN — SODIUM CHLORIDE, SODIUM LACTATE, POTASSIUM CHLORIDE, CALCIUM CHLORIDE: 600; 310; 30; 20 INJECTION, SOLUTION INTRAVENOUS at 14:16:00

## 2024-03-29 RX ADMIN — LIDOCAINE HYDROCHLORIDE 50 MG: 10 INJECTION, SOLUTION EPIDURAL; INFILTRATION; INTRACAUDAL; PERINEURAL at 14:24:00

## 2024-03-29 RX ADMIN — SODIUM CHLORIDE, SODIUM LACTATE, POTASSIUM CHLORIDE, CALCIUM CHLORIDE: 600; 310; 30; 20 INJECTION, SOLUTION INTRAVENOUS at 14:51:00

## 2024-03-29 NOTE — ANESTHESIA PREPROCEDURE EVALUATION
Anesthesia PreOp Note    HPI:     Joel David Aaseby is a 63 year old male who presents for preoperative consultation requested by: Diony Cosme MD    Date of Surgery: 3/29/2024    Procedure(s):  COLONOSCOPY  Indication: Bleeding internal hemorrhoids    Relevant Problems   No relevant active problems       NPO:                         History Review:  Patient Active Problem List    Diagnosis Date Noted    Abdominal aortic aneurysm (AAA) greater than 5.0 cm in diameter in female ([facility]) 08/22/2023    Essential hypertension 08/22/2023    Hyperlipidemia 08/22/2023    FRANCESCA (obstructive sleep apnea) 08/22/2023    CAD (coronary artery disease) 03/29/2022    Chronic obstructive pulmonary disease, unspecified ([facility]) 01/13/2022    Left cervical radiculopathy 01/13/2020    Brachial (cervical) neuritis 12/13/2019    Foraminal stenosis of cervical region 12/13/2019    Plantar fasciitis, bilateral 10/23/2018    Acquired bilateral valgus deformity of ankles 10/23/2018    Hemothorax on left 09/22/2018    Closed fracture of multiple ribs of left side with routine healing, subsequent encounter 09/22/2018    Rotator cuff tear arthropathy of right shoulder 09/08/2017    Dyspnea on exertion 12/21/2016    Primary osteoarthritis of left wrist 05/23/2016    Primary osteoarthritis of left knee 11/30/2015    Seroma, post-traumatic ([facility]) 09/03/2013    Carpal tunnel syndrome 07/22/2013    Osteoarthrosis, unspecified whether generalized or localized, lower leg 06/20/2013    Pain in limb 06/20/2013    Pain in joint, lower leg 06/20/2013    Osteoarthrosis, unspecified whether generalized or localized, hand 06/20/2013    Myalgia and myositis, unspecified 06/20/2013    Cervicalgia 06/20/2013    Lumbago 06/20/2013    Degeneration of cervical intervertebral disc 06/20/2013    Degeneration of lumbar or lumbosacral intervertebral disc 06/20/2013    Osteoarthrosis, unspecified whether generalized or localized, ankle and foot 06/20/2013    Contracture  of ankle and foot joint 06/20/2013    Gouty arthropathy, unspecified 06/20/2013    Spinal stenosis, lumbar region, without neurogenic claudication 06/20/2013       Past Medical History:   Diagnosis Date    Arthritis     Disorder of thyroid     High blood pressure     High cholesterol     Prediabetes     Sleep apnea        Past Surgical History:   Procedure Laterality Date    CARPAL TUNNEL RELEASE      OTHER AMBL SURG  8-19-13    LEFT THIGH - Dr. Reynoso    OTHER SURGICAL HISTORY      lung surgery after accident    SHOULDER ARTHROSCOPY Bilateral     TONSILLECTOMY         No medications prior to admission.     No current Epic-ordered facility-administered medications on file.     Current Outpatient Medications Ordered in Epic   Medication Sig Dispense Refill    Ascorbic Acid (VITAMIN C OR) Take by mouth.      Ergocalciferol (VITAMIN D OR) Take by mouth.      ZINC OR Use as directed in the mouth or throat.      Tirzepatide 2.5 MG/0.5ML Subcutaneous Solution Pen-injector Inject 2.5 mg into the skin.      predniSONE 20 MG Oral Tab take 2 tablets by mouth daily for 5 days for gout flare      lisinopril 10 MG Oral Tab lisinopriL 10 mg tablet, [RxNorm: 355509]      fluticasone propionate 50 MCG/ACT Nasal Suspension 1 spray by Each Nare route daily.      busPIRone 15 MG Oral Tab Take 1 tablet (15 mg total) by mouth 2 (two) times daily.      sennosides 17.2 MG Oral Tab Take 1 tablet (17.2 mg total) by mouth nightly as needed (constipation, as needed if no bowel movement that day). 30 tablet 0    polyethylene glycol, PEG 3350, 17 g Oral Powd Pack Take 17 g by mouth daily as needed (If no bowel movement in last 24 hours). 30 each 0    amLODIPine 10 MG Oral Tab Take 1 tablet (10 mg total) by mouth daily. 30 tablet 3    allopurinol 300 MG Oral Tab Take 1 tablet (300 mg total) by mouth daily.      clopidogrel 75 MG Oral Tab Take 1 tablet (75 mg total) by mouth daily.      losartan 50 MG Oral Tab Take 1 tablet (50 mg total) by  mouth daily.      aspirin 81 MG Oral Chew Tab Chew 1 tablet (81 mg total) by mouth daily. 30 tablet 11    rosuvastatin 40 MG Oral Tab Take 1 tablet (40 mg total) by mouth nightly.      fluticasone furoate-vilanterol (BREO ELLIPTA) 100-25 MCG/INH Inhalation Aerosol Powder, Breath Activated Inhale 1 puff into the lungs daily. Follow with mouth rinse and spit 1 each 6    BuPROPion HCl ER, XL, (WELLBUTRIN XL) 150 MG Oral Tablet 24 Hr Take 2 tablets (300 mg total) by mouth daily.      DULoxetine HCl (CYMBALTA) 30 MG Oral Cap DR Particles Take 3 capsules (90 mg total) by mouth at bedtime.      Levothyroxine Sodium (SYNTHROID, LEVOTHROID) 88 MCG Oral Tab Take 137.5 mcg by mouth before breakfast.      Omeprazole 40 MG Oral Capsule Delayed Release Take 1 capsule (40 mg total) by mouth daily.      phenylephrine-min oil-hector 0.25-14-74.9 % Rectal Ointment Place 1 g rectally 2 (two) times daily. (Patient not taking: Reported on 3/25/2024) 56 g 0       Allergies   Allergen Reactions    Lactose DIARRHEA       Family History   Problem Relation Age of Onset    Actinic Keratosis Father         kidney    Breast Cancer Mother     Arthritis Sister      Social History     Socioeconomic History    Marital status: Single   Tobacco Use    Smoking status: Some Days     Packs/day: 0     Types: Cigars, Pipe, Cigarettes    Smokeless tobacco: Never    Tobacco comments:     former pipe smoker, occasionally smokes cigars   Substance and Sexual Activity    Alcohol use: Yes     Comment: 1-2 glasses of wine daily    Drug use: Not Currently   Other Topics Concern    Caffeine Concern Yes     Comment: Coffee 5 cups daily    Pt has a pacemaker No    Pt has a defibrillator No    Reaction to local anesthetic No       Available pre-op labs reviewed.             Vital Signs:  Body mass index is 37.88 kg/m².   height is 1.88 m (6' 2\") and weight is 133.8 kg (295 lb).   Vitals:    03/25/24 1308   Weight: 133.8 kg (295 lb)   Height: 1.88 m (6' 2\")         Anesthesia Evaluation     Patient summary reviewed and Nursing notes reviewed    No history of anesthetic complications   Airway   Mallampati: II  TM distance: >3 FB  Neck ROM: full  Dental      Pulmonary     breath sounds clear to auscultation  (+) COPD (noted in epic although patient denies), sleep apnea on CPAP  Cardiovascular   (+) hypertension, CAD, CABG/stent (s/p PTCA/stents in 2022), REEDER  (-) past MI, dysrhythmias    Rhythm: regular  Rate: normal  ROS comment: AAA repair - August 2023  Denies any recent chest pain     Neuro/Psych    (+)  neuromuscular disease,        GI/Hepatic/Renal    (+) bowel prep  (-) GERD    Endo/Other    (+) blood dyscrasia (on dual antiplatelet therapy. last dose of plavix on 3/24/24.)    Comments: prediabetic  Abdominal   (+) obese                 Anesthesia Plan:   ASA:  3  Plan:   General  Informed Consent Plan and Risks Discussed With:  Patient  Use of Blood Products Discussed With:  Patient  Blood Product Use Consented        I have informed Joel David Aaseby and/or legal guardian or family member of the nature of the anesthetic plan, benefits, risks including possible dental damage if relevant, major complications, and any alternative forms of anesthetic management.   All of the patient's questions were answered to the best of my ability. The patient desires the anesthetic management as planned.  Bing Medina CRNA  3/29/2024 11:38 AM  Present on Admission:  **None**

## 2024-03-29 NOTE — H&P
RE-PROCEDURE UPDATE    HPI: Joel David Aaseby is a 63 year old male. 11/2/1960.    Patient presents for a colonoscopy.    ALLERGIES:   Allergies   Allergen Reactions    Lactose DIARRHEA       No current outpatient medications on file.     Past Medical History:   Diagnosis Date    Arthritis     Disorder of thyroid     High blood pressure     High cholesterol     Prediabetes     Sleep apnea      Past Surgical History:   Procedure Laterality Date    CARPAL TUNNEL RELEASE      OTHER AMBL SURG  8-19-13    LEFT THIGH - Dr. Reynoso    OTHER SURGICAL HISTORY      lung surgery after accident    SHOULDER ARTHROSCOPY Bilateral     TONSILLECTOMY         PHYSICAL EXAM:  Ht 6' 2\" (1.88 m)   Wt 295 lb (133.8 kg)   BMI 37.88 kg/m²   LUNGS:   Clear to auscultation.  CARDIAC:   RRR, normal S1, S2; no S3 or murmur appreciated.  ABDOMEN:   Bowel sounds normoactive. Soft, no organomegaly or masses appreciated. Nontender.    IMPRESSION: Rectal bleeding      PLAN:  No significant changes except as outlined above.     Colonoscopy with possible biopsy/polypectomy--the planned endoscopic procedure, indications, risks, benefits and post-op precautions were discussed and questions were answered in the pre-operative area.     Diony Cosme MD

## 2024-03-29 NOTE — DISCHARGE INSTRUCTIONS
ENDOSCOPY DISCHARGE INSTRUCTIONS    Procedure Performed:   Colonoscopy and Polypectomy    Endoscopist: No name on file  FINDINGS:   Internal/external hemorrhoids, Diverticulosis (pockets in colon that develop with age and lack of fiber intake), and Colon polyp(s) (a growth in the colon)    MEDICATIONS:  You may resume all other medications today    DIET:  High fiber/low fat diet    BIOPSIES:  Biopsy results will be released to you as soon as they are available as is now the law. This will not allow your physician the opportunity to go over these before they are released to you. It is not necessary to contact the office for explanation of these results. Your physician will contact you within a few business days of their release to review the findings with you    X-RAYS/LABS:   No X-rays/Labs were ordered today    ADDITIONAL RECOMMENDATIONS:        Activity for remainder of today:    REST TODAY  DO NOT drive or operate heavy machinery  DO NOT drink any alcoholic beverages  DO NOT sign any legal documents or make any important decisions    After your procedure(s):  It is not unusual to feel bloated or gassy .  Passing gas and belching is encouraged. Lying on your left side with your knees flexed may relieve the discomfort. A hot pack to the abdomen may also help.    After your gastroscopy (upper endoscopy): You may experience a slight sore throat which will subside. Throat lozenges or salt water gargle can be used.    FOLLOW-UP:  Contact the office at [phone] for follow-up appointment is needed or if you develop any of the following:    Severe abdominal pain/discomfort     Excessive bleeding                     Black tarry stool    Difficulty breathing/swallowing      Persistent nausea/vomiting  Fever above 100 degrees or chills            Home Care Instructions for Colonoscopy with Sedation    Diet:  - Resume your regular diet as tolerated unless otherwise instructed.  - Start with light meals to minimize  bloating.  - Do not drink alcohol today.    Medication:  - If you have questions about resuming your normal medications, please contact your Primary Care Physician.    Activities:  - Take it easy today. Do not return to work today.  - Do not drive today.  - Do not operate any machinery today (including kitchen equipment).    Colonoscopy:  - You may notice some rectal \"spotting\" (a little blood on the toilet tissue) for a day or two after the exam. This is normal.  - If you experience any rectal bleeding (not spotting), persistent tenderness or sharp severe abdominal pains, oral temperature over 100 degrees Fahrenheit, light-headedness or dizziness, or any other problems, contact your doctor.    **If unable to reach your doctor, please go to the [facility] Emergency Room**    - Your referring physician will receive a full report of your examination.  - If you do not hear from your doctor's office within two weeks of your biopsy, please call them for your results.    You may be able to see your laboratory results in [facility] between 4 and 7 business days.  In some cases, your physician may not have viewed the results before they are released to [facility].  If you have questions regarding your results contact the physician who ordered the test/exam by phone or via [facility] by choosing \"Ask a Medical Question.\"

## 2024-03-29 NOTE — ANESTHESIA POSTPROCEDURE EVALUATION
Patient: Joel David Aaseby    Procedure Summary       Date: 03/29/24 Room / Location: [facility] ENDOSCOPY 05 / [facility] ENDOSCOPY    Anesthesia Start: 1416 Anesthesia Stop:     Procedure: COLONOSCOPY Diagnosis:       Bleeding internal hemorrhoids      (polyps; diverticulosis; hemorroids)    Surgeons: Diony Cosme MD Anesthesiologist: Bing Medina CRNA    Anesthesia Type: general ASA Status: 3            Anesthesia Type: general    Vitals Value Taken Time   /57 03/29/24 1450   Temp  03/29/24 1451   Pulse 63 03/29/24 1450   Resp 12 03/29/24 1450   SpO2 98 % 03/29/24 1450       [facility] AN Post Evaluation:   Patient Evaluated in PACU  Patient Participation: complete - patient participated  Level of Consciousness: sleepy but conscious  Pain Score: 0  Pain Management: adequate  Airway Patency:patent  Yes    Nausea/Vomiting: none  Cardiovascular Status: hemodynamically stable  Respiratory Status: room air  Postoperative Hydration acceptable      Bing Medina CRNA  3/29/2024 2:51 PM

## 2024-03-29 NOTE — OPERATIVE REPORT
COLONOSCOPY REPORT    Patient Name:  Aaseby, Joel  Medical Record #: [id number]  YOB: 1960  Date of Procedure: 3/29/2024    Referring physician: MG HURTADO MD    Surgeon:  Diony Cosme MD    Pre-op diagnosis: Rectal bleeding  (Last colonoscopy 9 years ago)  Post-op diagnosis: Diverticulosis, 3 polyps and large internal hemorrhoids    Medications given:  MAC    Procedure:    After informed consent, discussion of risks and alternatives the patient was placed in the left lateral decubitus position and the high definition colonoscope was introduced into the rectum and advanced under direct visualization to the cecum which was identified by landmarks.  Bowel preparation was fair.  The mucosa was carefully inspected upon withdrawal of the scope.  Withdrawal time was 9 minutes.  ASA class was 3.    Findings:   The cecum was normal.  3 polyps each about 8 mm in diameter were cold snare excised from the transverse colon and descending colon (2).  There is extensive diverticulosis throughout the entire colon.  There was no evidence of ulcerations, colitis, vascular anomalies or mass lesions.  Retroflexed view of the anus revealed large internal hemorrhoids. Digital rectal exam was normal.    Plan:  High fiber diet  Await biopsy results    Specimens: polyps  EBL: minimal    Aronchick bowel prep scale:  5 Inadequate (repeat preparation needed)  4 Poor (semisolid stool could not be suctioned and <90% of mucosa seen)  3 Fair (semisolid stool could not be suctioned, but >90% of mucosa seen)  2 Good (clear liquid covering up to 25% of mucosa, but >90% of mucosa seen)  1 Excellent (>95% of mucosa seen)

## 2024-10-11 ENCOUNTER — IMMUNIZATION (OUTPATIENT)
Dept: LAB | Age: 64
End: 2024-10-11
Attending: EMERGENCY MEDICINE
Payer: COMMERCIAL

## 2024-10-11 DIAGNOSIS — Z23 NEED FOR VACCINATION: Primary | ICD-10-CM

## 2024-10-11 PROCEDURE — 90480 ADMN SARSCOV2 VAC 1/ONLY CMP: CPT

## 2024-10-11 PROCEDURE — 90662 IIV NO PRSV INCREASED AG IM: CPT

## 2024-10-11 PROCEDURE — 90471 IMMUNIZATION ADMIN: CPT

## 2025-05-05 DIAGNOSIS — I71.40 ABDOMINAL AORTIC ANEURYSM (AAA) 30 TO 34 MM IN DIAMETER: Primary | ICD-10-CM

## 2025-05-13 ENCOUNTER — HOSPITAL ENCOUNTER (OUTPATIENT)
Dept: CT IMAGING | Facility: HOSPITAL | Age: 65
Discharge: HOME | End: 2025-05-13
Attending: RADIOLOGY
Payer: COMMERCIAL

## 2025-05-13 DIAGNOSIS — I71.40 ABDOMINAL AORTIC ANEURYSM (AAA) 30 TO 34 MM IN DIAMETER: ICD-10-CM

## 2025-05-13 LAB
CREAT BLD-MCNC: 0.7 MG/DL (ref 0.7–1.3)
EGFRCR SERPLBLD CKD-EPI 2021: 102 ML/MIN/1.73M2 (ref 60–?)

## 2025-05-13 PROCEDURE — 82565 ASSAY OF CREATININE: CPT

## 2025-05-13 PROCEDURE — 74174 CTA ABD&PLVS W/CONTRAST: CPT | Performed by: RADIOLOGY

## (undated) DIAGNOSIS — Z98.61 S/P PTCA (PERCUTANEOUS TRANSLUMINAL CORONARY ANGIOPLASTY): ICD-10-CM

## (undated) DIAGNOSIS — I25.10 CAD (CORONARY ARTERY DISEASE): Primary | ICD-10-CM

## (undated) DEVICE — 3M™ TEGADERM™ TRANSPARENT FILM DRESSING, 1626W, 4 IN X 4-3/4 IN (10 CM X 12 CM), 50 EACH/CARTON, 4 CARTON/CASE: Brand: 3M™ TEGADERM™

## (undated) DEVICE — DRESSING FM 4.25X4.25IN PLMM

## (undated) DEVICE — BLAKE SILICONE DRAIN, 15 FR ROUND, HUBLESS WITH 3/16" TROCAR: Brand: BLAKE

## (undated) DEVICE — SINGLE-USE SNARE: Brand: CAPTIVATOR™ COLD

## (undated) DEVICE — DERMABOND LIQUID ADHESIVE

## (undated) DEVICE — SNARE OPTMZ PLPCTM TRP

## (undated) DEVICE — SUTURE VICRYL 0 UR-6

## (undated) DEVICE — DRAPE SRG 26X15IN UTL TPE STRL

## (undated) DEVICE — STERILE POLYISOPRENE POWDER-FREE SURGICAL GLOVES: Brand: PROTEXIS

## (undated) DEVICE — STERILE LATEX POWDER-FREE SURGICAL GLOVESWITH NITRILE COATING: Brand: PROTEXIS

## (undated) DEVICE — SOL  .9 1000ML BTL

## (undated) DEVICE — SUTURE MONOCRYL 3-0 Y936H

## (undated) DEVICE — KIT ENDO ORCAPOD 160/180/190

## (undated) DEVICE — SUTURE PDS II 2-0 CT-1

## (undated) DEVICE — KIT CLEAN ENDOKIT 1.1OZ GOWNX2

## (undated) DEVICE — SUTURE MONOCRYL 4-0 Y845G

## (undated) DEVICE — FLEXIPATH FLEXIBLE SURGICAL TROCARS: Brand: FLEXIPATH

## (undated) DEVICE — Device: Brand: DUAL NARE NASAL CANNULAE FEMALE LUER CON 7FT O2 TUBE

## (undated) DEVICE — SUCTION CANISTER, 3000CC,SAFELINER: Brand: DEROYAL

## (undated) DEVICE — MEDI-VAC NON-CONDUCTIVE SUCTION TUBING 6MM X 1.8M (6FT.) L: Brand: CARDINAL HEALTH

## (undated) DEVICE — THORACIC: Brand: MEDLINE INDUSTRIES, INC.

## (undated) DEVICE — Device

## (undated) DEVICE — 60 ML SYRINGE REGULAR TIP: Brand: MONOJECT

## (undated) DEVICE — TROCAR: Brand: KII FIOS FIRST ENTRY

## (undated) DEVICE — ABSORBABLE HEMOSTAT (OXIDIZED REGENERATED CELLULOSE, U.S.P.): Brand: SURGICEL

## (undated) DEVICE — CONTAINER SPEC STR 4OZ GRY LID

## (undated) DEVICE — 3M™ IOBAN™ 2 ANTIMICROBIAL INCISE DRAPE 6650EZ: Brand: IOBAN™ 2

## (undated) DEVICE — CLIP SM INTNL HRZN TI ESCP LGT

## (undated) DEVICE — TIE LCK 1.75IN COBE LG NYL SCR

## (undated) DEVICE — SUTURE ETHIBOND 0 CT-1

## (undated) DEVICE — DRAIN BLAKE 24FR O.H.

## (undated) DEVICE — TISSUE RETRIEVAL SYSTEM: Brand: INZII RETRIEVAL SYSTEM

## (undated) NOTE — LETTER
Hospital Discharge Documentation  Please phone to schedule a hospital follow up appointment. From: [address] Hospitalist's Office  Phone: [phone]    Patient discharged time/date: 2023  1:00 PM  Patient discharge disposition:  [facility]       Discharge Summary - D/C Summary        Discharge Summary signed by Dino Suarez MD at 2023  2:29 PM  Version 1 of 1      Author: Dino Suarez MD Service: Hospitalist Author Type: Physician    Filed: 2023  2:29 PM Date of Service: 2023 11:02 AM Status: Signed    : Dino Suarez MD (Physician)         [facility] HOSP [facility]    Discharge Summary    Joyce Leos Aaseby Patient Status:  Inpatient    1960 MRN [id number]   Location [facility] 2W/SW Attending Dino Suarez MD   Hosp Day # 1 PCP Silvia Joaquin MD     Date of Admission: 2023 Disposition: Home or Self Care     Date of Discharge: 23      Admitting Diagnosis: AAA (abdominal aortic aneurysm) [facility]) [I71.40]    Hospital Discharge Diagnoses:  AAA    Lace+ Score: 48  59-90 High Risk  29-58 Medium Risk  0-28   Low Risk. TCM Follow-Up Recommendation:  LACE > 58:  High Risk of readmission after discharge from the hospital.      Problem List: Patient Active Problem List:     Osteoarthrosis, unspecified whether generalized or localized, lower leg     Pain in limb     Pain in joint, lower leg     Osteoarthrosis, unspecified whether generalized or localized, hand     Myalgia and myositis, unspecified     Cervicalgia     Lumbago     Degeneration of cervical intervertebral disc     Degeneration of lumbar or lumbosacral intervertebral disc     Osteoarthrosis, unspecified whether generalized or localized, ankle and foot     Contracture of ankle and foot joint     Gouty arthropathy, unspecified     Spinal stenosis, lumbar region, without neurogenic claudication     Carpal tunnel syndrome     Seroma, post-traumatic ([address]     Primary osteoarthritis of left knee     Primary osteoarthritis of left wrist     Dyspnea on exertion     Rotator cuff tear arthropathy of right shoulder     Hemothorax on left     Closed fracture of multiple ribs of left side with routine healing, subsequent encounter     Plantar fasciitis, bilateral     Acquired bilateral valgus deformity of ankles     Brachial (cervical) neuritis     Foraminal stenosis of cervical region     Left cervical radiculopathy     Chronic obstructive pulmonary disease, unspecified (HCC)     CAD (coronary artery disease)     Abdominal aortic aneurysm (AAA) greater than 5.0 cm in diameter in female [facility])     Essential hypertension     Hyperlipidemia     FRANCESCA (obstructive sleep apnea)      Reason for Admission:   Abdominal aortic aneurysm (AAA) greater than 5.0 cm in diameter (HCC)  Chronic obstructive pulmonary disease, unspecified (HCC)  CAD (coronary artery disease)  Essential hypertension  Hyperlipidemia  FRANCESCA (obstructive sleep apnea)  DMII    Physical Exam:   See PN      History of Present Illness:   Per Dr. Esvin Craig  This is a 58-year-old white male. When I saw him today, he was aware of my being involved in his procedure with a 5.3 cm abdominal aortic aneurysm. The patient was seen with his sister present at the bedside. Also saw with Eric Garcia. The patient has no abdominal pain or back pain. He was diagnosed with an aneurysm after he had an accident while up in [place] and was found to have a hemopneumothorax. Unfortunately, he was not treated in [place] until he came down to [facility], was at [facility], and under direction of Dr. Tonya Alatorre had a diagnosis of hemothorax followed by Dr. Forest Salguero. This was treated surgically. At that time is when he found out he had the aneurysm. It has been followed and has been increasing in size. Although, it went down to 4.7 to 4.8 by elliptical imaging. It got up to 5.3 cm. He denies chest pain or shortness of breath.  He has history of PTCA of the coronary arteries, though it was done 1 year ago by [facility] with recent stress test showing no reversible ischemia. Hospital Course:   Abdominal aortic aneurysm (AAA) greater than 5.0 cm in diameter (HCC)  S/P AAA aneurysm endovascular repair  Pain controlled  -cont aspirin and plavix     Chronic obstructive pulmonary disease, unspecified ([address]  Cont home meds. CAD (coronary artery disease)  Cont home meds. Essential hypertension  Cont home meds losartan  Added norvasc  Ambulatory bp monitoring and cardiology f/u     Hyperlipidemia  Cont home meds. FRANCESCA (obstructive sleep apnea)  FRANCESCA protocol, monitor. DMII  -new diagnosis  -hba1c 6.5  -plan op follow-up with pcp  -discussed dietary modification    Consultations:   Vascular Surgery  IR  Cardiology    Procedures:   S/P AAA aneurysm endovascular repair    Complications: n/a    Discharge Condition: Good    Discharge Medications     Medication List        START taking these medications      amLODIPine 10 MG Tabs  Commonly known as: Norvasc  Take 1 tablet (10 mg total) by mouth daily. Start taking on: August 24, 2023     phenylephrine-min oil-hector 0.25-14-74.9 % Oint  Commonly known as: Formula R  Place 1 g rectally 2 (two) times daily. Polyethylene Glycol 3350 17 g Pack  Commonly known as: MIRALAX  Take 17 g by mouth daily as needed (If no bowel movement in last 24 hours). Sennosides 17.2 MG Tabs  Take 1 tablet (17.2 mg total) by mouth nightly as needed (constipation, as needed if no bowel movement that day). CONTINUE taking these medications      allopurinol 300 MG Tabs  Commonly known as: Zyloprim     aspirin 81 MG Chew  Chew 1 tablet (81 mg total) by mouth daily. Breo Ellipta 100-25 MCG/ACT Aepb  Generic drug: fluticasone furoate-vilanterol  Inhale 1 puff into the lungs daily. Follow with mouth rinse and spit     buPROPion  MG Tb24  Commonly known as:  Wellbutrin XL     clopidogrel 75 MG Tabs  Commonly known as: Plavix     DULoxetine 30 MG Cpep  Commonly known as: Cymbalta     levothyroxine 88 MCG Tabs  Commonly known as: Synthroid     losartan 50 MG Tabs  Commonly known as: Cozaar     Omeprazole 40 MG Cpdr     rosuvastatin 40 MG Tabs  Commonly known as: Crestor               Where to Get Your Medications        These medications were sent to [address], [address] [facility], [facility] [address]      Phone: [phone]   amLODIPine 10 MG Tabs  phenylephrine-min oil-hector 0.25-14-74.9 % Oint  Polyethylene Glycol 3350 17 g Pack  Sennosides 17.2 MG Tabs         Follow up Visits:  Follow-up with PCP in 1 week    Follow up Labs: n/a     Other Discharge Instructions: follow-up with IR and vascular surgery as instructed    Nina Glasgow MD  8/23/2023  11:02 AM    > 35 min       Electronically signed by Esteban Hart MD on 8/23/2023  2:29 PM

## (undated) NOTE — LETTER
[facility], [place]  Authorization for Invasive Procedures  1.  I hereby authorize Dr. Pineda Wallace. , my physician and whomever may be designated as the doctor's assistant, to perform the following operation and/or procedure:  Left T performed for the purposes of advancing medicine, science, and/or education, provided my identity is not revealed. If the procedure has been videotaped, the physician/surgeon will obtain the original videotape.  The hospital will not be responsible for stor My signature below affirms that prior to the time of the procedure, I have explained to the patient and/or his legal representative, the risks and benefits involved in the proposed treatment and any reasonable alternative to the proposed treatment.  I have

## (undated) NOTE — LETTER
Hospital Discharge Documentation  Please phone to schedule a hospital follow up appointment.     From: [address] Hospitalist's Office  Phone: [phone]    Patient discharged time/date: 9/28/2018  5:26 PM  Patient discharge disposition:  Home or Self Contracture of ankle and foot joint     Gouty arthropathy, unspecified     Spinal stenosis, lumbar region, without neurogenic claudication     Carpal tunnel syndrome     Seroma, post-traumatic (HCC)     Primary osteoarthritis of left knee     Primary os Pain control with IV and PO pain meds   Surgical consult, Dr. Ligia Butt following   CT abd reviewed, see report above     3.4 cm infrarenal abdominal aorta, finding on CT  - rec annual abdominal ultrasound     HTN  Continue home meds      Hypothyroidsm  Co BuPROPion HCl ER (XL) 150 MG Tb24  Commonly known as:  WELLBUTRIN XL      daily. Refills:  0     DULoxetine HCl 30 MG Cpep  Commonly known as:  CYMBALTA      Take 90 mg by mouth daily.    Refills:  0     ergocalciferol 87627 units Caps  Commonly known as: 9/28/2018 @ 11:06 AM    Greater than 30 minutes spent on preparation and coordination of this discharge[VS.1]    ADDENDUM: patient seen and examined. I agree with the above. He is feeling better today. Breathing stable.  He will be discharged home and will

## (undated) NOTE — LETTER
To Whom It May Concern:  I am sending this letter on behalf of patient  Shea Torres regarding the medical necessity for  patient to have sleep study that was completed on 12/11/18.        Patient shows symptoms that coincide with obstructive

## (undated) NOTE — LETTER
[facility]  Administration of Anesthesia  1. Hubert Lozada, or _________________________________ acting on his behalf, (Patient) (Dependent/Representative) request to receive anesthesia for my pending procedure/operation/treatment.   CIERRA castro 6. OBSTETRIC PATIENTS: Specific risks/consequences of spinal/epidural anesthesia may include itching, low blood pressure, difficulty urinating, slowing of the baby's heart rate and headache.  Rare risks include infections, high spinal block, spinal bleeding ___________________________________________________           _____________________________________________________  Date/Time                                                                                               Responsible person in case of minor

## (undated) NOTE — LETTER
[facility]  Administration of Anesthesia  I, Joel David Aaseby agree to be cared for by a physician anesthesiologist alone and/or with a nurse anesthetist, who is specially trained to monitor me and give me medicine to put me to sleep or keep me comfortable during my procedure    I understand that my anesthesiologist and/or anesthetist is not an employee or agent of [facility] or [employer]. He or she works for [facility] [employer] [facility]    As the patient asking for anesthesia services, I agree to:  Allow the anesthesiologist (anesthesia doctor) to give me medicine and do additional procedures as necessary. Some examples are: Starting or using an “IV” to give me medicine, fluids or blood during my procedure, and having a breathing tube placed to help me breathe when I’m asleep (intubation). In the event that my heart stops working properly, I understand that my anesthesiologist will make every effort to sustain my life, unless otherwise directed by [facility] Do Not Resuscitate documents.  Tell my anesthesia doctor before my procedure:  If I am pregnant.  The last time that I ate or drank.  iii. All of the medicines I take (including prescriptions, herbal supplements, and pills I can buy without a prescription (including street drugs/illegal medications). Failure to inform my anesthesiologist about these medicines may increase my risk of anesthetic complications.  iv.If I am allergic to anything or have had a reaction to anesthesia before.  I understand how the anesthesia medicine will help me (benefits).  I understand that with any type of anesthesia medicine there are risks:  The most common risks are: nausea, vomiting, sore throat, muscle soreness, damage to my eyes, mouth, or teeth (from breathing tube placement).  Rare risks include: remembering what happened during my procedure, allergic reactions to medications, injury to my airway, heart, lungs, vision, nerves, or  muscles and in extremely rare instances death.  My doctor has explained to me other choices available to me for my care (alternatives).  Pregnant Patients (“epidural”):  I understand that the risks of having an epidural (medicine given into my back to help control pain during labor), include itching, low blood pressure, difficulty urinating, headache or slowing of the baby’s heart. Very rare risks include infection, bleeding, seizure, irregular heart rhythms and nerve injury.  Regional Anesthesia (“spinal”, “epidural”, & “nerve blocks”):  I understand that rare but potential complications include headache, bleeding, infection, seizure, irregular heart rhythms, and nerve injury.    _____________________________________________________________________________  Patient (or Representative) Signature/Relationship to Patient  Date   Time    _____________________________________________________________________________   Name (if used)    Language/Organization   Time    _____________________________________________________________________________  Nurse Anesthetist Signature     Date   Time  _____________________________________________________________________________  Anesthesiologist Signature     Date   Time  I have discussed the procedure and information above with the patient (or patient’s representative) and answered their questions. The patient or their representative has agreed to have anesthesia services.    _____________________________________________________________________________  Witness        Date   Time  I have verified that the signature is that of the patient or patient’s representative, and that it was signed before the procedure  Patient Name: Joel David Aaseby     : 1960                 Printed: 3/27/2024 at 9:43 AM    Medical Record #: [id number]                                            Page 1 of 1  ----------ANESTHESIA CONSENT----------

## (undated) NOTE — LETTER
[facility]  [facility] OFFICE BUILDING, [address] [place] [address], [address] [address]  Dept: [phone]    2/15/2019    RE: Vidhi Hernandez         Medical Record: [id number]         Date of B

## (undated) NOTE — LETTER
[address] [place] [address] Rd, [place], [place]     AUTHORIZATION FOR SURGICAL OPERATION OR PROCEDURE    I hereby authorize Dr. Josh Murphy, my Physician(s) and whomever may be designated as the doctor's Assistant, to perform the following opera own blood, or a directed donor transfusion, I will discuss this with my Physician. 4. I consent to the photographing of procedure(s) to be performed for the purposes of advancing medicine, science and/or education, provided my identity is not revealed.  If _______________________________________________________________ ____________________________  (Witness signature)                                                                                                  (Date)                                (Time)

## (undated) NOTE — LETTER
[facility] [address], [place], [place]  [address]  INFORMED CONSENT FOR TRANSFUSION OF BLOOD OR BLOOD PRODUCTS  My physician has informed me of the nature, purpose, benefits and risks of transfusion for blood and blood components that he/she may deem necessary during my treatment or hospitalization. He/she has also discussed alternatives to receiving blood from the voluntary blood supply with me, such as self-donation (autologous) and directed donation (blood donated by family or friends to be used specifically for me). I further understand that while the [address] [place] [address] will attempt to supply any autologous or directed donor blood prior to transfusion of blood from the routine blood supply, medical circumstances may require that other or additional blood components may be required for my care. In giving consent, I acknowledge that my physician has also informed me that despite careful screening and testing in accordance with national and regional regulations, there is still a small risk of transmission of infectious agents including hepatitis, HIV-1/2, cytomegalovirus and other viruses or diseases as yet unknown for which licensed definitive screening tests do not currently exist. Additionally, my physician has informed me of the potential for transfusion reactions not related to an infectious agent. [  ]  Check here for Recurring Outpatient Transfusion Therapy (valid for 1 year) In addition to the above, my physician has informed me that I shall receive numerous transfusions over a period of time and that these can lead to other increased risks. I hereby authorize the transfusion of blood and/or blood products to me as deemed necessary and ordered by physicians participating in my care.  My physician has given me the opportunity to ask questions and any questions asked have been answered to my satisfaction  __________________________________________ ______________________________________________  (Signature of Patient)                                                            (Responsible party in case of Minor,                                                                                                 Incompetent, or unconscious Patient)  ___________________________________________       ________ ______________________________________  (Relationship to Patient)                                                       (Signature of Witness)  ______________________________________________________________________________________________   (Date)                                                                           (Time)  REFUSAL OF CONSENT FOR BLOOD TRANSFUSIONS   Sign only if Refusing   [  ] I understand refusal of blood or blood products as deemed necessary by my physician may have serious consequences to my condition to include possible death.  I hereby assume responsibility for my refusal and release the hospital, its personnel, and my physicians from any responsibility for the consequences of my refusal.    ________________________________________________________________________________  (Signature of Patient)                                                         (Responsible Party/Relationship to Patient)    ________________________________________________________________________________  (Signature of Witness)                                                       (Date/Time)     Patient Name: Jon Haile     : 1960                 Printed: 8/15/2023      Medical Record #: [id number]                                 Page 1 of 1

## (undated) NOTE — LETTER
[facility] [address], [place], [place]  [address]  INFORMED CONSENT FOR TRANSFUSION OF BLOOD OR BLOOD PRODUCTS  My physician has informed me of the nature, purpose, benefits and risks of transfusion for blood and blood components that __________________________________________          ______________________________________________  (Signature of Patient)                                                            (Responsible party in case of Minor,

## (undated) NOTE — LETTER
[facility]  [address], [place], [place]  Authorization for Surgical Operation and Procedure                                                                                           I hereby authorize Diony Cosme MD, my physician and his/her assistants (if applicable), which may include medical students, residents, and/or fellows, to perform the following surgical operation/ procedure and administer such anesthesia as may be determined necessary by my physician: Operation/Procedure name (s) COLONOSCOPY on Aaron David Aaseby   2.   I recognize that during the surgical operation/procedure, unforeseen conditions may necessitate additional or different procedures than those listed above.  I, therefore, further authorize and request that the above-named surgeon, assistants, or designees perform such procedures as are, in their judgment, necessary and desirable.    3.   My surgeon/physician has discussed prior to my surgery the potential benefits, risks and side effects of this procedure; the likelihood of achieving goals; and potential problems that might occur during recuperation.  They also discussed reasonable alternatives to the procedure, including risks, benefits, and side effects related to the alternatives and risks related to not receiving this procedure.  I have had all my questions answered and I acknowledge that no guarantee has been made as to the result that may be obtained.    4.   Should the need arise during my operation/procedure, which includes change of level of care prior to discharge, I also consent to the administration of blood and/or blood products.  Further, I understand that despite careful testing and screening of blood or blood products by collecting agencies, I may still be subject to ill effects as a result of receiving a blood transfusion and/or blood products.  The following are some, but not all, of the potential risks that can occur: fever and allergic  reactions, hemolytic reactions, transmission of diseases such as Hepatitis, AIDS and Cytomegalovirus (CMV) and fluid overload.  In the event that I wish to have an autologous transfusion of my own blood, or a directed donor transfusion, I will discuss this with my physician.  Check only if Refusing Blood or Blood Products  I understand refusal of blood or blood products as deemed necessary by my physician may have serious consequences to my condition to include possible death. I hereby assume responsibility for my refusal and release the hospital, its personnel, and my physicians from any responsibility for the consequences of my refusal.    o  Refuse   5.   I authorize the use of any specimen, organs, tissues, body parts or foreign objects that may be removed from my body during the operation/procedure for diagnosis, research or teaching purposes and their subsequent disposal by hospital authorities.  I also authorize the release of specimen test results and/or written reports to my treating physician on the hospital medical staff or other referring or consulting physicians involved in my care, at the discretion of the Pathologist or my treating physician.    6.   I consent to the photographing or videotaping of the operations or procedures to be performed, including appropriate portions of my body for medical, scientific, or educational purposes, provided my identity is not revealed by the pictures or by descriptive texts accompanying them.  If the procedure has been photographed/videotaped, the surgeon will obtain the original picture, image, videotape or CD.  The hospital will not be responsible for storage, release or maintenance of the picture, image, tape or CD.    7.   I consent to the presence of a  or observers in the operating room as deemed necessary by my physician or their designees.    8.   I recognize that in the event my procedure results in extended X-Ray/fluoroscopy time, I may  develop a skin reaction.    9. If I have a Do Not Attempt Resuscitation (DNAR) order in place, that status will be suspended while in the operating room, procedural suite, and during the recovery period unless otherwise explicitly stated by me (or a person authorized to consent on my behalf). The surgeon or my attending physician will determine when the applicable recovery period ends for purposes of reinstating the DNAR order.  10. Patients having a sterilization procedure: I understand that if the procedure is successful the results will be permanent and it will therefore be impossible for me to inseminate, conceive, or bear children.  I also understand that the procedure is intended to result in sterility, although the result has not been guaranteed.   11. I acknowledge that my physician has explained sedation/analgesia administration to me including the risk and benefits I consent to the administration of sedation/analgesia as may be necessary or desirable in the judgment of my physician.    I CERTIFY THAT I HAVE READ AND FULLY UNDERSTAND THE ABOVE CONSENT TO OPERATION and/or OTHER PROCEDURE.     _________________________________________ _________________________________     ___________________________________  Signature of Patient     Signature of Responsible Person                   Printed Name of Responsible Person                              _________________________________________ ______________________________        ___________________________________  Signature of Witness         Date  Time         Relationship to Patient    STATEMENT OF PHYSICIAN My signature below affirms that prior to the time of the procedure; I have explained to the patient and/or his/her legal representative, the risks and benefits involved in the proposed treatment and any reasonable alternative to the proposed treatment. I have also explained the risks and benefits involved in refusal of the proposed treatment and alternatives  to the proposed treatment and have answered the patient's questions. If I have a significant financial interest in a co-management agreement or a significant financial interest in any product or implant, or other significant relationship used in this procedure/surgery, I have disclosed this and had a discussion with my patient.     _______________________________________________________________ _____________________________  (Signature of Physician)                                                                                         (Date)                                   (Time)  Patient Name: Aaron Ambrosio Aasjoséstewart    : 1960   Printed: 3/27/2024      Medical Record #: [id number]                                              Page 1 of 1